# Patient Record
Sex: FEMALE | Race: BLACK OR AFRICAN AMERICAN | NOT HISPANIC OR LATINO | URBAN - METROPOLITAN AREA
[De-identification: names, ages, dates, MRNs, and addresses within clinical notes are randomized per-mention and may not be internally consistent; named-entity substitution may affect disease eponyms.]

---

## 2020-02-26 ENCOUNTER — EMERGENCY (EMERGENCY)
Facility: HOSPITAL | Age: 50
LOS: 1 days | Discharge: HOME | End: 2020-02-26
Attending: EMERGENCY MEDICINE | Admitting: EMERGENCY MEDICINE
Payer: OTHER MISCELLANEOUS

## 2020-02-26 VITALS
HEART RATE: 82 BPM | OXYGEN SATURATION: 98 % | SYSTOLIC BLOOD PRESSURE: 165 MMHG | RESPIRATION RATE: 20 BRPM | DIASTOLIC BLOOD PRESSURE: 89 MMHG | TEMPERATURE: 98 F

## 2020-02-26 VITALS
OXYGEN SATURATION: 99 % | WEIGHT: 160.06 LBS | HEART RATE: 73 BPM | TEMPERATURE: 98 F | SYSTOLIC BLOOD PRESSURE: 173 MMHG | HEIGHT: 64 IN | RESPIRATION RATE: 18 BRPM | DIASTOLIC BLOOD PRESSURE: 115 MMHG

## 2020-02-26 LAB — GLUCOSE BLDC GLUCOMTR-MCNC: 100 MG/DL — HIGH (ref 70–99)

## 2020-02-26 PROCEDURE — 99284 EMERGENCY DEPT VISIT MOD MDM: CPT

## 2020-02-26 RX ORDER — KETOROLAC TROMETHAMINE 30 MG/ML
60 SYRINGE (ML) INJECTION ONCE
Refills: 0 | Status: DISCONTINUED | OUTPATIENT
Start: 2020-02-26 | End: 2020-02-26

## 2020-02-26 RX ORDER — CYCLOBENZAPRINE HYDROCHLORIDE 10 MG/1
1 TABLET, FILM COATED ORAL
Qty: 15 | Refills: 0
Start: 2020-02-26 | End: 2020-03-01

## 2020-02-26 RX ORDER — IBUPROFEN 200 MG
1 TABLET ORAL
Qty: 30 | Refills: 0
Start: 2020-02-26

## 2020-02-26 RX ORDER — ACETAMINOPHEN 500 MG
2 TABLET ORAL
Qty: 60 | Refills: 0
Start: 2020-02-26

## 2020-02-26 RX ORDER — CYCLOBENZAPRINE HYDROCHLORIDE 10 MG/1
10 TABLET, FILM COATED ORAL ONCE
Refills: 0 | Status: COMPLETED | OUTPATIENT
Start: 2020-02-26 | End: 2020-02-26

## 2020-02-26 RX ORDER — ACETAMINOPHEN 500 MG
975 TABLET ORAL ONCE
Refills: 0 | Status: COMPLETED | OUTPATIENT
Start: 2020-02-26 | End: 2020-02-26

## 2020-02-26 RX ADMIN — Medication 60 MILLIGRAM(S): at 16:42

## 2020-02-26 RX ADMIN — Medication 975 MILLIGRAM(S): at 16:42

## 2020-02-26 RX ADMIN — CYCLOBENZAPRINE HYDROCHLORIDE 10 MILLIGRAM(S): 10 TABLET, FILM COATED ORAL at 16:42

## 2020-02-26 NOTE — ED PROVIDER NOTE - CHPI ED SYMPTOMS NEG
no chest pain, no SOB, no abdominal pain, no palpitations, no headache, no syncope/no blurred vision

## 2020-02-26 NOTE — ED ADULT TRIAGE NOTE - CHIEF COMPLAINT QUOTE
Patient to ED with complaint of dizziness, nausea, vomiting and neck pain.  Patient hypertensive in triage

## 2020-02-26 NOTE — ED PROVIDER NOTE - PROVIDER TOKENS
PROVIDER:[TOKEN:[[id number]]],PROVIDER:[TOKEN:[[id number]]],PROVIDER:[TOKEN:[[id number]]],PROVIDER:[TOKEN:[[id number]]]

## 2020-02-26 NOTE — ED PROVIDER NOTE - PATIENT PORTAL LINK FT
You can access the FollowMyHealth Patient Portal offered by [facility] by registering at the following website: [email or web address]. By joining [employer]’s FollowMyHealth portal, you will also be able to view your health information using other applications (apps) compatible with our system.

## 2020-02-26 NOTE — ED PROVIDER NOTE - OBJECTIVE STATEMENT
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 Pt states that 3 years ago she developed left UE, hip, and back pain after performing repetitive movements while at work. PT is an [employer] employee. She states she has been out of work since last summer due to vertigo and returned to work today. Today, during her first day back, she thinks she may have performed repetitive movements too much and exacerbated left arm/shoulder pain. In addition, while a train was passing by, she had a vertigo dizzy spell with associated N/V. Pt became anxious and concerned and decided to come to ED. Pt denies current vertigo sensation. No chest pain, SOB, abd pain, palpitations, headache, blurred vision, syncope, or other sx endorsed.

## 2020-02-26 NOTE — ED ADULT NURSE NOTE - OBJECTIVE STATEMENT
50 y/o F c/o L shoulder pain today. reports she has hx of shoulder pain and strain, reports she went back to work today and was doing strenuous activity when it became much more painful with movement. reports she has hx of vertigo and htn and she began to feel dizzy when the trains were passing her. reports the episode has since resolved but the shoulder feels very "tight".

## 2020-02-26 NOTE — ED PROVIDER NOTE - CLINICAL SUMMARY MEDICAL DECISION MAKING FREE TEXT BOX
Patient presenting with mild vertigo and L shoulder pain radiating down left arm (gets both these in recurrent manner). Got Toradol, APA and Flexeril; (used to take this0. Will d/c with same. Rec fu for body work and PT.

## 2020-02-26 NOTE — ED PROVIDER NOTE - CARE PROVIDERS DIRECT ADDRESSES
[email or web address]

## 2020-02-26 NOTE — ED PROVIDER NOTE - CARE PROVIDER_API CALL
Aba Dejesus)  Otolaryngology  [address] [facility], 2nd [place] [address]  Phone: [phone]  Fax: [phone]  Follow Up Time:     Gian Khoury)  Otolaryngology  [address] [place] [address]  Phone: [phone]  Fax: [phone]  Follow Up Time:     Madeleine Borden)  Surgical Physicians  [address] [place] [address], [address] [place] [address]  Phone: [phone]  Fax: [phone]  Follow Up Time:     Marielos Alberts)  Neurological Surgery  [address] [place] [address], [facility] [address]  [place] [address]  Phone: [phone]  Fax: [phone]  Follow Up Time:

## 2020-02-26 NOTE — ED PROVIDER NOTE - NSFOLLOWUPINSTRUCTIONS_ED_ALL_ED_FT
Please consider asking your ENT MD (or see one of ours) for vestibular PT (got vertigo).    You might also benefit from acupuncture, massage and an osteopathic evaluation.    Consider follow up with Dr. Flynn or Daryl at [facility] [place].     [facility] has a community clinic for massage and acupuncture.

## 2020-03-03 DIAGNOSIS — R42 DIZZINESS AND GIDDINESS: ICD-10-CM

## 2020-03-03 DIAGNOSIS — M62.838 OTHER MUSCLE SPASM: ICD-10-CM

## 2023-04-06 ENCOUNTER — EMERGENCY (EMERGENCY)
Facility: HOSPITAL | Age: 53
LOS: 1 days | Discharge: HOME | End: 2023-04-06
Attending: EMERGENCY MEDICINE | Admitting: EMERGENCY MEDICINE
Payer: MEDICAID

## 2023-04-06 VITALS
SYSTOLIC BLOOD PRESSURE: 121 MMHG | HEART RATE: 68 BPM | DIASTOLIC BLOOD PRESSURE: 86 MMHG | WEIGHT: 160.06 LBS | OXYGEN SATURATION: 100 % | HEIGHT: 65 IN | TEMPERATURE: 98 F | RESPIRATION RATE: 18 BRPM

## 2023-04-06 VITALS
SYSTOLIC BLOOD PRESSURE: 143 MMHG | RESPIRATION RATE: 16 BRPM | HEART RATE: 71 BPM | DIASTOLIC BLOOD PRESSURE: 91 MMHG | OXYGEN SATURATION: 97 %

## 2023-04-06 DIAGNOSIS — I10 ESSENTIAL (PRIMARY) HYPERTENSION: ICD-10-CM

## 2023-04-06 DIAGNOSIS — R11.0 NAUSEA: ICD-10-CM

## 2023-04-06 DIAGNOSIS — Z88.0 ALLERGY STATUS TO PENICILLIN: ICD-10-CM

## 2023-04-06 DIAGNOSIS — E03.9 HYPOTHYROIDISM, UNSPECIFIED: ICD-10-CM

## 2023-04-06 DIAGNOSIS — R10.13 EPIGASTRIC PAIN: ICD-10-CM

## 2023-04-06 PROBLEM — R42 DIZZINESS AND GIDDINESS: Chronic | Status: ACTIVE | Noted: 2020-02-26

## 2023-04-06 LAB
ALBUMIN SERPL ELPH-MCNC: 3.8 G/DL — SIGNIFICANT CHANGE UP (ref 3.4–5)
ALP SERPL-CCNC: 83 U/L — SIGNIFICANT CHANGE UP (ref 40–120)
ALT FLD-CCNC: 47 U/L — HIGH (ref 12–42)
ANION GAP SERPL CALC-SCNC: 7 MMOL/L — LOW (ref 9–16)
AST SERPL-CCNC: 30 U/L — SIGNIFICANT CHANGE UP (ref 15–37)
BASOPHILS # BLD AUTO: 0.02 K/UL — SIGNIFICANT CHANGE UP (ref 0–0.2)
BASOPHILS NFR BLD AUTO: 0.3 % — SIGNIFICANT CHANGE UP (ref 0–2)
BILIRUB DIRECT SERPL-MCNC: 0.1 MG/DL — SIGNIFICANT CHANGE UP (ref 0–0.3)
BILIRUB INDIRECT FLD-MCNC: 0.3 MG/DL — SIGNIFICANT CHANGE UP (ref 0.2–1)
BILIRUB SERPL-MCNC: 0.4 MG/DL — SIGNIFICANT CHANGE UP (ref 0.2–1.2)
BUN SERPL-MCNC: 14 MG/DL — SIGNIFICANT CHANGE UP (ref 7–23)
CALCIUM SERPL-MCNC: 9.2 MG/DL — SIGNIFICANT CHANGE UP (ref 8.5–10.5)
CHLORIDE SERPL-SCNC: 105 MMOL/L — SIGNIFICANT CHANGE UP (ref 96–108)
CO2 SERPL-SCNC: 31 MMOL/L — SIGNIFICANT CHANGE UP (ref 22–31)
CREAT SERPL-MCNC: 1.02 MG/DL — SIGNIFICANT CHANGE UP (ref 0.5–1.3)
EGFR: 66 ML/MIN/1.73M2 — SIGNIFICANT CHANGE UP
EOSINOPHIL # BLD AUTO: 0.16 K/UL — SIGNIFICANT CHANGE UP (ref 0–0.5)
EOSINOPHIL NFR BLD AUTO: 2.7 % — SIGNIFICANT CHANGE UP (ref 0–6)
GLUCOSE SERPL-MCNC: 95 MG/DL — SIGNIFICANT CHANGE UP (ref 70–99)
HCT VFR BLD CALC: 42.5 % — SIGNIFICANT CHANGE UP (ref 34.5–45)
HGB BLD-MCNC: 14 G/DL — SIGNIFICANT CHANGE UP (ref 11.5–15.5)
IMM GRANULOCYTES NFR BLD AUTO: 0.2 % — SIGNIFICANT CHANGE UP (ref 0–0.9)
LIDOCAIN IGE QN: 147 U/L — SIGNIFICANT CHANGE UP (ref 73–393)
LYMPHOCYTES # BLD AUTO: 2.39 K/UL — SIGNIFICANT CHANGE UP (ref 1–3.3)
LYMPHOCYTES # BLD AUTO: 39.8 % — SIGNIFICANT CHANGE UP (ref 13–44)
MCHC RBC-ENTMCNC: 29.4 PG — SIGNIFICANT CHANGE UP (ref 27–34)
MCHC RBC-ENTMCNC: 32.9 GM/DL — SIGNIFICANT CHANGE UP (ref 32–36)
MCV RBC AUTO: 89.1 FL — SIGNIFICANT CHANGE UP (ref 80–100)
MONOCYTES # BLD AUTO: 0.4 K/UL — SIGNIFICANT CHANGE UP (ref 0–0.9)
MONOCYTES NFR BLD AUTO: 6.7 % — SIGNIFICANT CHANGE UP (ref 2–14)
NEUTROPHILS # BLD AUTO: 3.03 K/UL — SIGNIFICANT CHANGE UP (ref 1.8–7.4)
NEUTROPHILS NFR BLD AUTO: 50.3 % — SIGNIFICANT CHANGE UP (ref 43–77)
NRBC # BLD: 0 /100 WBCS — SIGNIFICANT CHANGE UP (ref 0–0)
PLATELET # BLD AUTO: 189 K/UL — SIGNIFICANT CHANGE UP (ref 150–400)
POTASSIUM SERPL-MCNC: 4.1 MMOL/L — SIGNIFICANT CHANGE UP (ref 3.5–5.3)
POTASSIUM SERPL-SCNC: 4.1 MMOL/L — SIGNIFICANT CHANGE UP (ref 3.5–5.3)
PROT SERPL-MCNC: 7.7 G/DL — SIGNIFICANT CHANGE UP (ref 6.4–8.2)
RBC # BLD: 4.77 M/UL — SIGNIFICANT CHANGE UP (ref 3.8–5.2)
RBC # FLD: 13.2 % — SIGNIFICANT CHANGE UP (ref 10.3–14.5)
SODIUM SERPL-SCNC: 143 MMOL/L — SIGNIFICANT CHANGE UP (ref 132–145)
WBC # BLD: 6.01 K/UL — SIGNIFICANT CHANGE UP (ref 3.8–10.5)
WBC # FLD AUTO: 6.01 K/UL — SIGNIFICANT CHANGE UP (ref 3.8–10.5)

## 2023-04-06 PROCEDURE — 99284 EMERGENCY DEPT VISIT MOD MDM: CPT

## 2023-04-06 RX ORDER — SODIUM CHLORIDE 9 MG/ML
1000 INJECTION INTRAMUSCULAR; INTRAVENOUS; SUBCUTANEOUS ONCE
Refills: 0 | Status: COMPLETED | OUTPATIENT
Start: 2023-04-06 | End: 2023-04-06

## 2023-04-06 RX ORDER — ONDANSETRON 8 MG/1
4 TABLET, FILM COATED ORAL ONCE
Refills: 0 | Status: COMPLETED | OUTPATIENT
Start: 2023-04-06 | End: 2023-04-06

## 2023-04-06 RX ORDER — FAMOTIDINE 10 MG/ML
20 INJECTION INTRAVENOUS ONCE
Refills: 0 | Status: COMPLETED | OUTPATIENT
Start: 2023-04-06 | End: 2023-04-06

## 2023-04-06 RX ADMIN — FAMOTIDINE 20 MILLIGRAM(S): 10 INJECTION INTRAVENOUS at 16:41

## 2023-04-06 RX ADMIN — ONDANSETRON 4 MILLIGRAM(S): 8 TABLET, FILM COATED ORAL at 16:41

## 2023-04-06 RX ADMIN — SODIUM CHLORIDE 1000 MILLILITER(S): 9 INJECTION INTRAMUSCULAR; INTRAVENOUS; SUBCUTANEOUS at 16:41

## 2023-04-06 NOTE — ED PROVIDER NOTE - PATIENT PORTAL LINK FT
You can access the FollowMyHealth Patient Portal offered by [facility] by registering at the following website: [email or web address]. By joining [employer]’s FollowMyHealth portal, you will also be able to view your health information using other applications (apps) compatible with our system.

## 2023-04-06 NOTE — ED ADULT TRIAGE NOTE - CHIEF COMPLAINT QUOTE
Pt walked in c/o of upper abdominal pain and nausea x yesterday. Denies fevers or V/D. PMH of HTN. Sent in from  for further evaluation.

## 2023-04-06 NOTE — ED PROVIDER NOTE - CLINICAL SUMMARY MEDICAL DECISION MAKING FREE TEXT BOX
52-year-old female presents emergency department with history concerning for early gastroenteritis.  We will do labs give Pepcid Zofran fluids and reassess.

## 2023-04-06 NOTE — ED PROVIDER NOTE - OBJECTIVE STATEMENT
52-year-old female past medical history of hypothyroidism hypertension presents emergency department for nausea and epigastric pain and sensation that she thinks she might have diarrhea.  No chest pain no shortness of breath no fever no chills.  No history of abdominal surgeries.

## 2023-04-06 NOTE — ED ADULT NURSE NOTE - NSIMPLEMENTINTERV_GEN_ALL_ED
Implemented All Universal Safety Interventions:  [place] to call system. Call bell, personal items and telephone within reach. Instruct patient to call for assistance. Room bathroom lighting operational. Non-slip footwear when patient is off stretcher. Physically safe environment: no spills, clutter or unnecessary equipment. Stretcher in lowest position, wheels locked, appropriate side rails in place.

## 2023-04-06 NOTE — ED PROVIDER NOTE - PHYSICAL EXAMINATION
Const: NAD  Eyes: PERRL, no conjunctival injection  HENT:  Neck supple without meningismus   CV: RRR, Warm, well-perfused extremities  RESP: CTA B/L, no tachypnea   GI: soft, epigastric tenderess, non-distended  MSK: No gross deformities appreciated  Skin: Warm, dry. No rashes  Neuro: Alert, CNs II-XII grossly intact. Sensation and motor function of extremities grossly intact.  Psych: Appropriate mood and affect.

## 2023-04-06 NOTE — ED ADULT NURSE NOTE - OBJECTIVE STATEMENT
pt is 52y female, here for epigastric abd pain and nausea since yesterday, pt denies any vomiting, fevers or diarrhea, pain is cramping and non radiating, pt reports she was referred to ED for possibly abnormal EKG, pt is a&o3, ambulatory with steady gait, abd soft, non distended, non tender, NAD noted

## 2023-04-06 NOTE — ED PROVIDER NOTE - NSFOLLOWUPINSTRUCTIONS_ED_ALL_ED_FT

## 2024-10-03 NOTE — ED PROVIDER NOTE - INTERNATIONAL TRAVEL
Quality 47: Advance Care Plan: Advance Care Planning discussed and documented; advance care plan or surrogate decision maker documented in the medical record. Quality 226: Preventive Care And Screening: Tobacco Use: Screening And Cessation Intervention: Patient screened for tobacco use and is an ex/non-smoker Detail Level: Detailed Quality 130: Documentation Of Current Medications In The Medical Record: Current Medications Documented No

## 2025-04-13 ENCOUNTER — EMERGENCY (EMERGENCY)
Facility: HOSPITAL | Age: 55
LOS: 1 days | End: 2025-04-13
Attending: EMERGENCY MEDICINE | Admitting: EMERGENCY MEDICINE
Payer: MEDICAID

## 2025-04-13 VITALS
OXYGEN SATURATION: 94 % | HEART RATE: 67 BPM | DIASTOLIC BLOOD PRESSURE: 93 MMHG | RESPIRATION RATE: 16 BRPM | SYSTOLIC BLOOD PRESSURE: 148 MMHG | TEMPERATURE: 97 F

## 2025-04-13 VITALS
HEART RATE: 80 BPM | DIASTOLIC BLOOD PRESSURE: 90 MMHG | OXYGEN SATURATION: 97 % | SYSTOLIC BLOOD PRESSURE: 138 MMHG | TEMPERATURE: 98 F | RESPIRATION RATE: 16 BRPM

## 2025-04-13 LAB
ALBUMIN SERPL ELPH-MCNC: 0.7 G/DL — LOW (ref 3.4–5)
ALP SERPL-CCNC: 80 U/L — SIGNIFICANT CHANGE UP (ref 40–120)
ALT FLD-CCNC: 63 U/L — HIGH (ref 12–42)
ANION GAP SERPL CALC-SCNC: 1 MMOL/L — LOW (ref 9–16)
APPEARANCE UR: CLEAR — SIGNIFICANT CHANGE UP
AST SERPL-CCNC: 63 U/L — HIGH (ref 15–37)
BASOPHILS # BLD AUTO: 0.02 K/UL — SIGNIFICANT CHANGE UP (ref 0–0.2)
BASOPHILS NFR BLD AUTO: 0.3 % — SIGNIFICANT CHANGE UP (ref 0–2)
BILIRUB SERPL-MCNC: 0.3 MG/DL — SIGNIFICANT CHANGE UP (ref 0.2–1.2)
BILIRUB UR-MCNC: NEGATIVE — SIGNIFICANT CHANGE UP
BUN SERPL-MCNC: 17 MG/DL — SIGNIFICANT CHANGE UP (ref 7–23)
CALCIUM SERPL-MCNC: 9.1 MG/DL — SIGNIFICANT CHANGE UP (ref 8.5–10.5)
CHLORIDE SERPL-SCNC: 105 MMOL/L — SIGNIFICANT CHANGE UP (ref 96–108)
CO2 SERPL-SCNC: 35 MMOL/L — HIGH (ref 22–31)
COLOR SPEC: YELLOW — SIGNIFICANT CHANGE UP
CREAT SERPL-MCNC: 0.83 MG/DL — SIGNIFICANT CHANGE UP (ref 0.5–1.3)
DIFF PNL FLD: NEGATIVE — SIGNIFICANT CHANGE UP
EGFR: 83 ML/MIN/1.73M2 — SIGNIFICANT CHANGE UP
EGFR: 83 ML/MIN/1.73M2 — SIGNIFICANT CHANGE UP
EOSINOPHIL # BLD AUTO: 0.04 K/UL — SIGNIFICANT CHANGE UP (ref 0–0.5)
EOSINOPHIL NFR BLD AUTO: 0.6 % — SIGNIFICANT CHANGE UP (ref 0–6)
GLUCOSE SERPL-MCNC: 120 MG/DL — HIGH (ref 70–99)
GLUCOSE UR QL: NEGATIVE MG/DL — SIGNIFICANT CHANGE UP
HCT VFR BLD CALC: 42.6 % — SIGNIFICANT CHANGE UP (ref 34.5–45)
HGB BLD-MCNC: 14.2 G/DL — SIGNIFICANT CHANGE UP (ref 11.5–15.5)
IMM GRANULOCYTES # BLD AUTO: 0.01 K/UL — SIGNIFICANT CHANGE UP (ref 0–0.07)
IMM GRANULOCYTES NFR BLD AUTO: 0.2 % — SIGNIFICANT CHANGE UP (ref 0–0.9)
KETONES UR-MCNC: NEGATIVE MG/DL — SIGNIFICANT CHANGE UP
LACTATE BLDV-MCNC: 1.5 MMOL/L — SIGNIFICANT CHANGE UP (ref 0.5–2)
LEUKOCYTE ESTERASE UR-ACNC: NEGATIVE — SIGNIFICANT CHANGE UP
LIDOCAIN IGE QN: 69 U/L — SIGNIFICANT CHANGE UP (ref 16–77)
LYMPHOCYTES # BLD AUTO: 1.16 K/UL — SIGNIFICANT CHANGE UP (ref 1–3.3)
LYMPHOCYTES NFR BLD AUTO: 18.6 % — SIGNIFICANT CHANGE UP (ref 13–44)
MCHC RBC-ENTMCNC: 28.8 PG — SIGNIFICANT CHANGE UP (ref 27–34)
MCHC RBC-ENTMCNC: 33.3 G/DL — SIGNIFICANT CHANGE UP (ref 32–36)
MCV RBC AUTO: 86.4 FL — SIGNIFICANT CHANGE UP (ref 80–100)
MONOCYTES # BLD AUTO: 0.55 K/UL — SIGNIFICANT CHANGE UP (ref 0–0.9)
MONOCYTES NFR BLD AUTO: 8.8 % — SIGNIFICANT CHANGE UP (ref 2–14)
NEUTROPHILS # BLD AUTO: 4.45 K/UL — SIGNIFICANT CHANGE UP (ref 1.8–7.4)
NEUTROPHILS NFR BLD AUTO: 71.5 % — SIGNIFICANT CHANGE UP (ref 43–77)
NITRITE UR-MCNC: NEGATIVE — SIGNIFICANT CHANGE UP
NRBC # BLD AUTO: 0 K/UL — SIGNIFICANT CHANGE UP (ref 0–0)
NRBC # FLD: 0 K/UL — SIGNIFICANT CHANGE UP (ref 0–0)
NRBC BLD AUTO-RTO: 0 /100 WBCS — SIGNIFICANT CHANGE UP (ref 0–0)
PH UR: 6 — SIGNIFICANT CHANGE UP (ref 5–8)
PLATELET # BLD AUTO: 157 K/UL — SIGNIFICANT CHANGE UP (ref 150–400)
PMV BLD: 9.5 FL — SIGNIFICANT CHANGE UP (ref 7–13)
POTASSIUM SERPL-MCNC: 3.4 MMOL/L — LOW (ref 3.5–5.3)
POTASSIUM SERPL-SCNC: 3.4 MMOL/L — LOW (ref 3.5–5.3)
PROT SERPL-MCNC: 7.3 G/DL — SIGNIFICANT CHANGE UP (ref 6.4–8.2)
PROT UR-MCNC: NEGATIVE MG/DL — SIGNIFICANT CHANGE UP
RBC # BLD: 4.93 M/UL — SIGNIFICANT CHANGE UP (ref 3.8–5.2)
RBC # FLD: 12.7 % — SIGNIFICANT CHANGE UP (ref 10.3–14.5)
SODIUM SERPL-SCNC: 141 MMOL/L — SIGNIFICANT CHANGE UP (ref 132–145)
SP GR SPEC: 1.01 — SIGNIFICANT CHANGE UP (ref 1–1.03)
UROBILINOGEN FLD QL: 0.2 MG/DL — SIGNIFICANT CHANGE UP (ref 0.2–1)
WBC # BLD: 6.23 K/UL — SIGNIFICANT CHANGE UP (ref 3.8–10.5)
WBC # FLD AUTO: 6.23 K/UL — SIGNIFICANT CHANGE UP (ref 3.8–10.5)

## 2025-04-13 PROCEDURE — 74177 CT ABD & PELVIS W/CONTRAST: CPT | Mod: 26

## 2025-04-13 PROCEDURE — 76705 ECHO EXAM OF ABDOMEN: CPT | Mod: 26

## 2025-04-13 PROCEDURE — 99284 EMERGENCY DEPT VISIT MOD MDM: CPT

## 2025-04-13 RX ORDER — ONDANSETRON HCL/PF 4 MG/2 ML
1 VIAL (ML) INJECTION
Qty: 15 | Refills: 0
Start: 2025-04-13

## 2025-04-13 RX ORDER — KETOROLAC TROMETHAMINE 30 MG/ML
15 INJECTION, SOLUTION INTRAMUSCULAR; INTRAVENOUS ONCE
Refills: 0 | Status: DISCONTINUED | OUTPATIENT
Start: 2025-04-13 | End: 2025-04-13

## 2025-04-13 RX ORDER — IOHEXOL 350 MG/ML
30 INJECTION, SOLUTION INTRAVENOUS ONCE
Refills: 0 | Status: COMPLETED | OUTPATIENT
Start: 2025-04-13 | End: 2025-04-13

## 2025-04-13 RX ORDER — MAGNESIUM, ALUMINUM HYDROXIDE 200-200 MG
30 TABLET,CHEWABLE ORAL ONCE
Refills: 0 | Status: COMPLETED | OUTPATIENT
Start: 2025-04-13 | End: 2025-04-13

## 2025-04-13 RX ORDER — ONDANSETRON HCL/PF 4 MG/2 ML
4 VIAL (ML) INJECTION ONCE
Refills: 0 | Status: COMPLETED | OUTPATIENT
Start: 2025-04-13 | End: 2025-04-13

## 2025-04-13 RX ORDER — NAPROXEN SODIUM 275 MG
1 TABLET ORAL
Qty: 20 | Refills: 0
Start: 2025-04-13

## 2025-04-13 RX ADMIN — Medication 4 MILLIGRAM(S): at 09:42

## 2025-04-13 RX ADMIN — KETOROLAC TROMETHAMINE 15 MILLIGRAM(S): 30 INJECTION, SOLUTION INTRAMUSCULAR; INTRAVENOUS at 11:58

## 2025-04-13 RX ADMIN — Medication 1000 MILLILITER(S): at 09:42

## 2025-04-13 RX ADMIN — Medication 1000 MILLILITER(S): at 12:01

## 2025-04-13 RX ADMIN — Medication 30 MILLILITER(S): at 09:42

## 2025-04-13 RX ADMIN — IOHEXOL 30 MILLILITER(S): 350 INJECTION, SOLUTION INTRAVENOUS at 09:42

## 2025-04-13 RX ADMIN — KETOROLAC TROMETHAMINE 15 MILLIGRAM(S): 30 INJECTION, SOLUTION INTRAMUSCULAR; INTRAVENOUS at 14:36

## 2025-04-13 NOTE — ED PROVIDER NOTE - CARE PLAN
1 Principal Discharge DX:	Abdominal pain   Principal Discharge DX:	Abdominal pain  Secondary Diagnosis:	Common bile duct dilation  Secondary Diagnosis:	Abnormal abdominal CT scan

## 2025-04-13 NOTE — ED ADULT NURSE NOTE - NSFALLUNIVINTERV_ED_ALL_ED
Bed/Stretcher in lowest position, wheels locked, appropriate side rails in place/Call bell, personal items and telephone in reach/Instruct patient to call for assistance before getting out of bed/chair/stretcher/Non-slip footwear applied when patient is off stretcher/[place] to call system/Physically safe environment - no spills, clutter or unnecessary equipment/Purposeful proactive rounding/Room/bathroom lighting operational, light cord in reach

## 2025-04-13 NOTE — ED PROVIDER NOTE - PATIENT PORTAL LINK FT
You can access the FollowMyHealth Patient Portal offered by [facility] by registering at the following website: [email or web address]. By joining [employer]’s FollowMyHealth portal, you will also be able to view your health information using other applications (apps) compatible with our system.

## 2025-04-13 NOTE — ED PROVIDER NOTE - ATTENDING APP SHARED VISIT CONTRIBUTION OF CARE
54-year-old female past medical history of hypertension presents today with abdominal pain, nausea, vomiting, diarrhea that started yesterday to today.  Patient reports she had some URI symptoms of a sore throat and chills last week and then the GI symptoms started Saturday.  Denies associated dysuria, hematuria, flank pain, headache, dizziness, chest pain, shortness of breath, flank pain, hematochezia, recent travel or antibiotic use    CT and sono performed.  Case d/w Dr Nuno attending surgeon.  OUtpatient follow up.

## 2025-04-13 NOTE — ED PROVIDER NOTE - CLINICAL SUMMARY MEDICAL DECISION MAKING FREE TEXT BOX
54-year-old female past medical history of hypertension presents today with abdominal pain, nausea, vomiting, diarrhea that started yesterday to today.  Patient reports she had some URI symptoms of a sore throat and chills last week and then the GI symptoms started Saturday.   patient afebrile, vital signs stable.  Patient with tenderness to palpation in the abdomen, suspect that symptoms are likely gastroenteritis from proceeding URI however given her, vomiting, diarrhea, CT abdomen pelvis throughout intra-abdominal pathology.  Will reassess after labs and imaging for disposition 54-year-old female past medical history of hypertension presents today with abdominal pain, nausea, vomiting, diarrhea that started yesterday to today.  Patient reports she had some URI symptoms of a sore throat and chills last week and then the GI symptoms started Saturday.   patient afebrile, vital signs stable.  Patient with tenderness to palpation in the abdomen, suspect that symptoms are likely gastroenteritis from proceeding URI however given her, vomiting, diarrhea, CT abdomen pelvis throughout intra-abdominal pathology.  Will reassess after labs and imaging for disposition    1413–reviewed results with patient, abdomen soft tolerated p.o.  Discussed case with Dr. Nuno  Of general surgery and recommended outpatient follow-up.  Will have patient follow-up outpatient with general surgery, strict return precautions discussed with the patient   reviewed abnormal findings on the CT scan with the patient as well, will have patient follow-up with gastroenterology for incidental findings.

## 2025-04-13 NOTE — ED PROVIDER NOTE - CARE PROVIDER_API CALL
Luz Nuno   Surgery  [address], [address]  [place] [address]  Phone: [phone]  Fax: [phone]  Follow Up Time: Urgent

## 2025-04-13 NOTE — ED PROVIDER NOTE - NS ED ROS FT
· CONSTITUTIONAL: no fever and no chills.  · CARDIOVASCULAR: normal rate and rhythm, no chest pain and no edema.  · RESPIRATORY: no chest pain, no cough, and no shortness of breath.  · GASTROINTESTINAL: + abdominal pain, no bloating, no constipation, +NVD  · MUSCULOSKELETAL: no back pain, no musculoskeletal pain, no neck pain, and no weakness.  · SKIN: no abrasions, no jaundice, no lesions, no pruritis, and no rashes.  · NEURO: no loss of consciousness, no gait abnormality, no headache, no sensory deficits, and no weakness.  · PSYCHIATRIC: no known mental health issues.

## 2025-04-13 NOTE — ED PROVIDER NOTE - OBJECTIVE STATEMENT
54-year-old female past medical history of hypertension presents today with abdominal pain, nausea, vomiting, diarrhea that started yesterday to today.  Patient reports she had some URI symptoms of a sore throat and chills last week and then the GI symptoms started Saturday.  Denies associated dysuria, hematuria, flank pain, headache, dizziness, chest pain, shortness of breath, flank pain, hematochezia, recent travel or antibiotic use

## 2025-04-14 ENCOUNTER — EMERGENCY (EMERGENCY)
Facility: HOSPITAL | Age: 55
LOS: 1 days | End: 2025-04-14
Attending: EMERGENCY MEDICINE | Admitting: EMERGENCY MEDICINE
Payer: MEDICAID

## 2025-04-14 ENCOUNTER — INPATIENT (INPATIENT)
Facility: HOSPITAL | Age: 55
LOS: 1 days | Discharge: HOME | End: 2025-04-16
Attending: STUDENT IN AN ORGANIZED HEALTH CARE EDUCATION/TRAINING PROGRAM | Admitting: STUDENT IN AN ORGANIZED HEALTH CARE EDUCATION/TRAINING PROGRAM
Payer: COMMERCIAL

## 2025-04-14 VITALS
RESPIRATION RATE: 16 BRPM | TEMPERATURE: 99 F | OXYGEN SATURATION: 95 % | DIASTOLIC BLOOD PRESSURE: 86 MMHG | SYSTOLIC BLOOD PRESSURE: 149 MMHG | HEART RATE: 69 BPM

## 2025-04-14 VITALS
RESPIRATION RATE: 16 BRPM | HEART RATE: 74 BPM | SYSTOLIC BLOOD PRESSURE: 142 MMHG | DIASTOLIC BLOOD PRESSURE: 78 MMHG | TEMPERATURE: 99 F | OXYGEN SATURATION: 97 %

## 2025-04-14 VITALS
HEART RATE: 94 BPM | OXYGEN SATURATION: 94 % | WEIGHT: 179.9 LBS | HEIGHT: 65 IN | TEMPERATURE: 98 F | SYSTOLIC BLOOD PRESSURE: 146 MMHG | DIASTOLIC BLOOD PRESSURE: 101 MMHG | RESPIRATION RATE: 18 BRPM

## 2025-04-14 DIAGNOSIS — Z98.890 OTHER SPECIFIED POSTPROCEDURAL STATES: Chronic | ICD-10-CM

## 2025-04-14 DIAGNOSIS — U07.1 COVID-19: ICD-10-CM

## 2025-04-14 DIAGNOSIS — K63.5 POLYP OF COLON: ICD-10-CM

## 2025-04-14 DIAGNOSIS — Z88.0 ALLERGY STATUS TO PENICILLIN: ICD-10-CM

## 2025-04-14 DIAGNOSIS — Z29.9 ENCOUNTER FOR PROPHYLACTIC MEASURES, UNSPECIFIED: ICD-10-CM

## 2025-04-14 DIAGNOSIS — R19.7 DIARRHEA, UNSPECIFIED: ICD-10-CM

## 2025-04-14 DIAGNOSIS — I10 ESSENTIAL (PRIMARY) HYPERTENSION: ICD-10-CM

## 2025-04-14 DIAGNOSIS — R10.13 EPIGASTRIC PAIN: ICD-10-CM

## 2025-04-14 DIAGNOSIS — I48.91 UNSPECIFIED ATRIAL FIBRILLATION: ICD-10-CM

## 2025-04-14 DIAGNOSIS — K80.50 CALCULUS OF BILE DUCT WITHOUT CHOLANGITIS OR CHOLECYSTITIS WITHOUT OBSTRUCTION: ICD-10-CM

## 2025-04-14 DIAGNOSIS — R11.2 NAUSEA WITH VOMITING, UNSPECIFIED: ICD-10-CM

## 2025-04-14 LAB
ALBUMIN SERPL ELPH-MCNC: 4 G/DL — SIGNIFICANT CHANGE UP (ref 3.4–5)
ALP SERPL-CCNC: 126 U/L — HIGH (ref 40–120)
ALT FLD-CCNC: 373 U/L — HIGH (ref 12–42)
ANION GAP SERPL CALC-SCNC: 6 MMOL/L — LOW (ref 9–16)
AST SERPL-CCNC: 452 U/L — HIGH (ref 15–37)
BASOPHILS # BLD AUTO: 0.01 K/UL — SIGNIFICANT CHANGE UP (ref 0–0.2)
BASOPHILS NFR BLD AUTO: 0.2 % — SIGNIFICANT CHANGE UP (ref 0–2)
BILIRUB DIRECT SERPL-MCNC: 1.2 MG/DL — HIGH (ref 0–0.3)
BILIRUB SERPL-MCNC: 1.8 MG/DL — HIGH (ref 0.2–1.2)
BUN SERPL-MCNC: 11 MG/DL — SIGNIFICANT CHANGE UP (ref 7–23)
CALCIUM SERPL-MCNC: 9 MG/DL — SIGNIFICANT CHANGE UP (ref 8.5–10.5)
CHLORIDE SERPL-SCNC: 103 MMOL/L — SIGNIFICANT CHANGE UP (ref 96–108)
CO2 SERPL-SCNC: 32 MMOL/L — HIGH (ref 22–31)
CREAT SERPL-MCNC: 1.01 MG/DL — SIGNIFICANT CHANGE UP (ref 0.5–1.3)
EGFR: 66 ML/MIN/1.73M2 — SIGNIFICANT CHANGE UP
EGFR: 66 ML/MIN/1.73M2 — SIGNIFICANT CHANGE UP
EOSINOPHIL # BLD AUTO: 0.05 K/UL — SIGNIFICANT CHANGE UP (ref 0–0.5)
EOSINOPHIL NFR BLD AUTO: 1 % — SIGNIFICANT CHANGE UP (ref 0–6)
FLUAV AG NPH QL: SIGNIFICANT CHANGE UP
FLUBV AG NPH QL: SIGNIFICANT CHANGE UP
GLUCOSE SERPL-MCNC: 113 MG/DL — HIGH (ref 70–99)
HCT VFR BLD CALC: 43 % — SIGNIFICANT CHANGE UP (ref 34.5–45)
HGB BLD-MCNC: 14.2 G/DL — SIGNIFICANT CHANGE UP (ref 11.5–15.5)
IMM GRANULOCYTES # BLD AUTO: 0.01 K/UL — SIGNIFICANT CHANGE UP (ref 0–0.07)
IMM GRANULOCYTES NFR BLD AUTO: 0.2 % — SIGNIFICANT CHANGE UP (ref 0–0.9)
LIDOCAIN IGE QN: 73 U/L — SIGNIFICANT CHANGE UP (ref 16–77)
LYMPHOCYTES # BLD AUTO: 1.8 K/UL — SIGNIFICANT CHANGE UP (ref 1–3.3)
LYMPHOCYTES NFR BLD AUTO: 35 % — SIGNIFICANT CHANGE UP (ref 13–44)
MAGNESIUM SERPL-MCNC: 2.2 MG/DL — SIGNIFICANT CHANGE UP (ref 1.6–2.6)
MCHC RBC-ENTMCNC: 28.7 PG — SIGNIFICANT CHANGE UP (ref 27–34)
MCHC RBC-ENTMCNC: 33 G/DL — SIGNIFICANT CHANGE UP (ref 32–36)
MCV RBC AUTO: 87 FL — SIGNIFICANT CHANGE UP (ref 80–100)
MONOCYTES # BLD AUTO: 0.47 K/UL — SIGNIFICANT CHANGE UP (ref 0–0.9)
MONOCYTES NFR BLD AUTO: 9.1 % — SIGNIFICANT CHANGE UP (ref 2–14)
NEUTROPHILS # BLD AUTO: 2.8 K/UL — SIGNIFICANT CHANGE UP (ref 1.8–7.4)
NEUTROPHILS NFR BLD AUTO: 54.5 % — SIGNIFICANT CHANGE UP (ref 43–77)
NRBC # BLD AUTO: 0 K/UL — SIGNIFICANT CHANGE UP (ref 0–0)
NRBC # FLD: 0 K/UL — SIGNIFICANT CHANGE UP (ref 0–0)
NRBC BLD AUTO-RTO: 0 /100 WBCS — SIGNIFICANT CHANGE UP (ref 0–0)
PLATELET # BLD AUTO: 171 K/UL — SIGNIFICANT CHANGE UP (ref 150–400)
PMV BLD: 9.7 FL — SIGNIFICANT CHANGE UP (ref 7–13)
POTASSIUM SERPL-MCNC: 3.6 MMOL/L — SIGNIFICANT CHANGE UP (ref 3.5–5.3)
POTASSIUM SERPL-SCNC: 3.6 MMOL/L — SIGNIFICANT CHANGE UP (ref 3.5–5.3)
PROT SERPL-MCNC: 7.5 G/DL — SIGNIFICANT CHANGE UP (ref 6.4–8.2)
RBC # BLD: 4.94 M/UL — SIGNIFICANT CHANGE UP (ref 3.8–5.2)
RBC # FLD: 12.9 % — SIGNIFICANT CHANGE UP (ref 10.3–14.5)
RSV RNA NPH QL NAA+NON-PROBE: SIGNIFICANT CHANGE UP
SARS-COV-2 RNA SPEC QL NAA+PROBE: DETECTED
SODIUM SERPL-SCNC: 141 MMOL/L — SIGNIFICANT CHANGE UP (ref 132–145)
SOURCE RESPIRATORY: SIGNIFICANT CHANGE UP
WBC # BLD: 5.14 K/UL — SIGNIFICANT CHANGE UP (ref 3.8–10.5)
WBC # FLD AUTO: 5.14 K/UL — SIGNIFICANT CHANGE UP (ref 3.8–10.5)

## 2025-04-14 PROCEDURE — 71045 X-RAY EXAM CHEST 1 VIEW: CPT | Mod: 26

## 2025-04-14 PROCEDURE — 99285 EMERGENCY DEPT VISIT HI MDM: CPT

## 2025-04-14 PROCEDURE — 74183 MRI ABD W/O CNTR FLWD CNTR: CPT | Mod: 26

## 2025-04-14 PROCEDURE — 99223 1ST HOSP IP/OBS HIGH 75: CPT

## 2025-04-14 RX ORDER — HYDROCHLOROTHIAZIDE 50 MG/1
1 TABLET ORAL
Refills: 0 | DISCHARGE

## 2025-04-14 RX ORDER — ONDANSETRON HCL/PF 4 MG/2 ML
4 VIAL (ML) INJECTION ONCE
Refills: 0 | Status: COMPLETED | OUTPATIENT
Start: 2025-04-14 | End: 2025-04-14

## 2025-04-14 RX ORDER — LISINOPRIL 5 MG/1
1 TABLET ORAL
Refills: 0 | DISCHARGE

## 2025-04-14 RX ORDER — ONDANSETRON HCL/PF 4 MG/2 ML
4 VIAL (ML) INJECTION ONCE
Refills: 0 | Status: ACTIVE | OUTPATIENT
Start: 2025-04-14

## 2025-04-14 RX ORDER — LISINOPRIL 5 MG/1
10 TABLET ORAL EVERY 24 HOURS
Refills: 0 | Status: DISCONTINUED | OUTPATIENT
Start: 2025-04-14 | End: 2025-04-16

## 2025-04-14 RX ORDER — INFLUENZA A VIRUS A/IDAHO/07/2018 (H1N1) ANTIGEN (MDCK CELL DERIVED, PROPIOLACTONE INACTIVATED, INFLUENZA A VIRUS A/INDIANA/08/2018 (H3N2) ANTIGEN (MDCK CELL DERIVED, PROPIOLACTONE INACTIVATED), INFLUENZA B VIRUS B/SINGAPORE/INFTT-16-0610/2016 ANTIGEN (MDCK CELL DERIVED, PROPIOLACTONE INACTIVATED), INFLUENZA B VIRUS B/IOWA/06/2017 ANTIGEN (MDCK CELL DERIVED, PROPIOLACTONE INACTIVATED) 15; 15; 15; 15 UG/.5ML; UG/.5ML; UG/.5ML; UG/.5ML
0.5 INJECTION, SUSPENSION INTRAMUSCULAR ONCE
Refills: 0 | Status: COMPLETED | OUTPATIENT
Start: 2025-04-14 | End: 2025-04-14

## 2025-04-14 RX ORDER — ONDANSETRON HCL/PF 4 MG/2 ML
4 VIAL (ML) INJECTION EVERY 8 HOURS
Refills: 0 | Status: DISCONTINUED | OUTPATIENT
Start: 2025-04-14 | End: 2025-04-16

## 2025-04-14 RX ORDER — CIPROFLOXACIN HCL 250 MG
400 TABLET ORAL ONCE
Refills: 0 | Status: COMPLETED | OUTPATIENT
Start: 2025-04-14 | End: 2025-04-14

## 2025-04-14 RX ORDER — METRONIDAZOLE 250 MG
500 TABLET ORAL ONCE
Refills: 0 | Status: COMPLETED | OUTPATIENT
Start: 2025-04-14 | End: 2025-04-14

## 2025-04-14 RX ADMIN — Medication 4 MILLIGRAM(S): at 03:36

## 2025-04-14 RX ADMIN — Medication 200 MILLIGRAM(S): at 03:36

## 2025-04-14 RX ADMIN — Medication 100 MILLIGRAM(S): at 04:35

## 2025-04-14 RX ADMIN — Medication 20 MILLIGRAM(S): at 03:36

## 2025-04-14 RX ADMIN — Medication 4 MILLIGRAM(S): at 16:08

## 2025-04-14 RX ADMIN — Medication 1000 MILLILITER(S): at 04:36

## 2025-04-14 RX ADMIN — LISINOPRIL 10 MILLIGRAM(S): 5 TABLET ORAL at 13:22

## 2025-04-14 RX ADMIN — Medication 1000 MILLILITER(S): at 03:37

## 2025-04-14 RX ADMIN — Medication 4 MILLIGRAM(S): at 16:23

## 2025-04-14 RX ADMIN — Medication 400 MILLIGRAM(S): at 04:35

## 2025-04-14 RX ADMIN — Medication 4 MILLIGRAM(S): at 04:35

## 2025-04-14 NOTE — ED PROVIDER NOTE - OBJECTIVE STATEMENT
55 y/o f hx HTN presents transferred from [facility] for admission for abd pain, choledocholithiasis on imaging.  Pt was seen at [facility] 4/13 for abd pain, n/v, had CT a/p showing dilated cbd and had elevated liver enzymes.  Pt was discharged and advised to f/u with surgery but returned today c/o persistent abd pain, n/v.  Pt had w/u at [facility] showing uptrending liver enzymes and given persistent sx, decision was made to admit pt to medicine with plan for GI eval, likely MRCP.  Pt was accepted for admission and brought to [facility] where was found to be in afib on arrival and reportedly from bed board admission was canceled and pt brought to ED for further evaluation.  Pt was incidentally covid +, reports mild sore throat over the past 3 days.  Denies fever, diarrhea, CP, SOB, all other ROS negative.

## 2025-04-14 NOTE — ED PROVIDER NOTE - PROGRESS NOTE DETAILS
discussed with medicine regarding admission and being told by MALACHI they will not accept pt until surgery has evaluated, surgery consulted, will f/u recs pt seen by surgery, no acute surgical intervention, recommending GI, MRCP, admit to medicine

## 2025-04-14 NOTE — H&P ADULT - PROBLEM SELECTOR PLAN 5
CTAP: nodular soft tissue area in the proximal transverse colon may represent intestinal debris however a colonoscopy not recently performed, recommend colonoscopy to exclude polypoid lesion.  Patient denies any previous colonoscopies, although had FIT test 2 years ago which was normal  - Outpatient follow up

## 2025-04-14 NOTE — H&P ADULT - PROBLEM SELECTOR PLAN 3
Patient with incidental finding of COVID with mild URI symptoms  No oxygen requirements  - Isolation precautions  - No remdesivir or dexamethasone indicated at this time

## 2025-04-14 NOTE — ED PROVIDER NOTE - CPE EDP RESP NORM
normal... Has The Growth Been Previously Biopsied?: has been previously biopsied Body Location Override (Optional): right lateral forehead

## 2025-04-14 NOTE — CONSULT NOTE ADULT - ASSESSMENT
54F with PMH of HTN and PSH of LUCAS (~10years ago) presenting for nausea, vomiting, severe epigastric pain and diarrhea which started 3 days ago. Patient was transferred from  to [facility] ED yesterday, after work up which revealed choledocholiathiasis. CT abdomen showed mild thickening vs. underdistention of descending colon and rectosigmoid colon, may represent colitis infecitous vs inflammatory, along with mildly prominent intrahepatic and extrahepatic biliary ducts without definitive radiopaque mass/calculus. RUQ US showed biliary ductal dilation with the CBD measuring to 9mm. Rads recommended further eval with MRI/MRCP. No cholelithiasis or evidence of acute cholecystitis on US. Labs with AST/ALT mildly elevated, normal t.bili and normal lipase. Patient was discharged with naproxen and zofran. Returned to  ED tonight with severe epigastric pain and persistent bilious vomiting, unable to tolerate PO. Plan was to admit to [facility] under medicine with plan for GI eval, likely MRCP. Pt was accepted for admission and brought to [facility], found to be in afib on arrival, reportedly bedboard cancelled admission and patient was brought to ED for further evaluation. Patient incidentally tested positive for COVID, reported mild sore throat, cough and runny nose over past 3 days. Reports mild chills since Saturday but denied any fever, CP or SOB. In the ED, patient was normotensive, nontachycardic, afebrile, satting well in RA. On exam, abd soft, mildly TTP in epigastric region, ND, neg [place]. Labs with , Hg 14.2, AST//373 (63/63), alk phos 126 (60), t bili 1.8 (0.3), lipase 73. No new imaging was completed today. General surgery was consulted for choledocholithiasis.    Recs:  - admit to medicine under Dr. Kaur for possible scope  - MRCP for further eval  - GI consult  - no surgical intervention at this time    Case discussed with Dr. Nuno and chief resident

## 2025-04-14 NOTE — ED ADULT NURSE NOTE - HIV OFFER
Will you ask if you risk factor things:  As she flown or had surgery recently?  Any history of blood clots?  Any extended periods of sitting down such as a long car ride?  As far as I know she does not smoke, can you confirm that.  Is she on hormones?    Thank you!   Previously Declined (within the last year) English

## 2025-04-14 NOTE — H&P ADULT - ASSESSMENT
Patient is a 55 y/o female with PMHx of HTN presents transferred from [facility] for admission for abd pain and choledocholithiasis found on imaging.

## 2025-04-14 NOTE — ED ADULT NURSE NOTE - OBJECTIVE STATEMENT
Patient is a 54y/F transfer from [facility] with c/o abdominal pain with nausea,vomiting,diarrhea for 3 days. pt was also diagnosed with new onset afib. pt is awake,alert,ox4. denies any chest pain, no SOB. pt is awake, alert, ox4. pt changed to hospital gown,

## 2025-04-14 NOTE — ED PROVIDER NOTE - CLINICAL SUMMARY MEDICAL DECISION MAKING FREE TEXT BOX
Colitis versus gastroenteritis versus choledocholithiasis.  This is patient's second ED visit over the past 2 days, patient returning for severe upper abdominal pain with vomiting and inability to tolerate p.o.  Most likely will need admission to rule out choledocholithiasis given recent imaging showed dilated CBD, patient would probably benefit from MRCP versus ERCP.  Will order labs, analgesia ordered, IV fluids, antiemetics, IV antibiotics for possible colitis ordered. Colitis versus gastroenteritis versus choledocholithiasis.  This is patient's second ED visit over the past 2 days, patient returning for severe upper abdominal pain with vomiting and inability to tolerate p.o.  Most likely will need admission to rule out choledocholithiasis given recent imaging showed dilated CBD, patient would probably benefit from MRCP versus ERCP.  Will order labs, analgesia ordered, IV fluids, antiemetics, IV antibiotics for possible colitis ordered.    4am Labs reviewed. Worsening LFTs compared to yesterday including AST/ALT. t bili today elevated (yesterday normal), Alk phos elevated today (normal yesterday), high suspicion for choledocholithiasis. Patient with no diarrhea while in ED for 4 hours, no RF for c diff, no recent travel. non isolation bed, discussed with Dr. Meza medicine hospitalist who accepts patient for admission Colitis versus gastroenteritis versus choledocholithiasis.  This is patient's second ED visit over the past 2 days, patient returning for severe upper abdominal pain with vomiting and inability to tolerate p.o.  Most likely will need admission to rule out choledocholithiasis given recent imaging showed dilated CBD, patient would probably benefit from MRCP versus ERCP.  Will order labs, analgesia ordered, IV fluids, antiemetics, IV antibiotics for possible colitis ordered.    4am Labs reviewed. Worsening LFTs compared to yesterday including AST/ALT. t bili today elevated (yesterday normal), Alk phos elevated today (normal yesterday), high suspicion for choledocholithiasis. Patient with no diarrhea while in ED for 4 hours, no RF for c diff, no recent travel. non isolation bed, discussed with Dr. Meza medicine hospitalist who accepts patient for admission    tested positive for covid-19, isolation order placed.

## 2025-04-14 NOTE — H&P ADULT - NSHPPHYSICALEXAM_GEN_ALL_CORE
T(C): 36.4 (04-14-25 @ 10:05), Max: 37 (04-14-25 @ 00:12)  HR: 70 (04-14-25 @ 10:05) (67 - 94)  BP: 157/95 (04-14-25 @ 10:05) (142/78 - 157/95)  RR: 18 (04-14-25 @ 10:05) (16 - 18)  SpO2: 96% (04-14-25 @ 10:05) (94% - 97%)    CONSTITUTIONAL: Well groomed, no apparent distress  EYES: PERRLA and symmetric, EOMI, No conjunctival or scleral injection, non-icteric  ENMT: Oral mucosa with moist membranes. Normal dentition; no pharyngeal injection or exudates             NECK: Supple, symmetric and without tracheal deviation   RESP: No respiratory distress, no use of accessory muscles; CTA b/l, no WRR  CV: RRR, +S1S2, no MRG; no JVD; no peripheral edema  GI: Soft, NT, ND, no rebound, no guarding; no palpable masses; no hepatosplenomegaly; no hernia palpated  LYMPH: No cervical LAD or tenderness; no axillary LAD or tenderness; no inguinal LAD or tenderness  MSK: Normal gait; No digital clubbing or cyanosis; examination of the (head/neck/spine/ribs/pelvis, RUE, LUE, RLE, LLE) without misalignment,            Normal ROM without pain, no spinal tenderness, normal muscle strength/tone  SKIN: No rashes or ulcers noted; no subcutaneous nodules or induration palpable  NEURO: CN II-XII intact; normal reflexes in upper and lower extremities, sensation intact in upper and lower extremities b/l to light touch   PSYCH: Appropriate insight/judgment; A+O x 3, mood and affect appropriate, recent/remote memory intact T(C): 36.4 (04-14-25 @ 10:05), Max: 37 (04-14-25 @ 00:12)  HR: 70 (04-14-25 @ 10:05) (67 - 94)  BP: 157/95 (04-14-25 @ 10:05) (142/78 - 157/95)  RR: 18 (04-14-25 @ 10:05) (16 - 18)  SpO2: 96% (04-14-25 @ 10:05) (94% - 97%)    CONSTITUTIONAL: Well groomed, no apparent distress  EYES: PERRLA and symmetric, EOMI, No conjunctival or scleral injection, non-icteric  ENMT: Oral mucosa with moist membranes. Normal dentition; no pharyngeal injection or exudates  RESP: No respiratory distress, no use of accessory muscles; CTA b/l, no WRR  CV: RRR, +S1S2, no MRG; no JVD; no peripheral edema  GI: Soft, NT, ttp over the epigastric region  SKIN: No rashes or ulcers noted; no subcutaneous nodules or induration palpable  NEURO: CN II-XII intact; normal reflexes in upper and lower extremities, sensation intact in upper and lower extremities b/l to light touch   PSYCH: Appropriate insight/judgment; A+O x 3, mood and affect appropriate, recent/remote memory intact

## 2025-04-14 NOTE — ED PROVIDER NOTE - CLINICAL SUMMARY MEDICAL DECISION MAKING FREE TEXT BOX
55 y/o f hx HTN presents transferred from [facility] for admission to [facility] for choledocholithiasis with persistent abd pain, n/v.  Vitals in ED unremarkable, pt in afib which is new although rate controlled in 70s and asymptomatic.  Will discuss with medicine for admission.

## 2025-04-14 NOTE — ED ADULT NURSE NOTE - COVID-19 RESULT DATE/TIME

## 2025-04-14 NOTE — H&P ADULT - ATTENDING COMMENTS
Ms. Rojas is a 55yo woman with PMHx HTN who presented to [facility] 4/13 for abdominal pain who then re-presented 4/14 for worsening sx, found to have direct hyperbilirubinemia and transaminitis c/f choledocolithiasis, noted to be in rate-controlled afib on transfer, now admitted to [facility].    Patient reports severe abdominal pain, nausea, vomiting and subjective fevers at home. Reports no hx of afib. On exam, well-appearing, cardiac RRR, lungs clear, abdomen soft mild tender (after morphine), no SHASTA. Covid+    #Direct hyperbilirubinemia  #Transaminitis  - MRCP done -- CBD dilated up to 8mm with mild intrahepatic biliary dilatation, no visible stones. DDx includes sphincter of Oddi dysfunction  - Trend hepatic panel  - GI, surgery consulted    #New onset afib, rate-controlled -- noted on EKG 6am 4/14. Suspect iso underlying process (likely choledocolithiasis). [id number] 2 (HTN, female).  - Monitor on tele -- has been in sinus, anticipate can transfer to [facility] tomorrow if remains in NSR or rate-controlled afib  - Will need discussion of AC prior to discharge, may benefit from Holter/ILR    #Covid-19 - no O2 requirement, denies SOB  - C/w supportive care

## 2025-04-14 NOTE — ED PROVIDER NOTE - OBJECTIVE STATEMENT
54-year-old female with past medical history of hypertension presents complaining of nausea, vomiting, severe epigastric pain, diarrhea that started 3 days ago.  Patient was seen and evaluated in the emergency department yesterday, had extensive workup including labs, CT abdomen and right upper quadrant ultrasound.  CT abdomen showed mild thickening versus underdistention of the descending colon and rectosigmoid colon which may represent colitis differential including infectious versus inflammatory etiology.  Also mildly prominent intrahepatic and extrahepatic biliary ducts without definitive radiopaque mass or calculus.  Right upper quadrant ultrasound showed biliary ductal dilation with the common bile duct measuring up to 9 mm.  Radiologist recommended further evaluation with MRI/ MRCP.  No cholelithiasis or evidence of acute cholecystitis on ultrasound.  Labs from yesterday reviewed, normal white count AST and ALT mildly elevated, normal T. bili, normal lipase.  Patient was discharged home with naproxen and Zofran.  Patient return to the emergency room tonight with severe epigastric pain and persistent vomiting, unable to tolerate p.o.

## 2025-04-14 NOTE — ED PROVIDER NOTE - ATTENDING APP SHARED VISIT CONTRIBUTION OF CARE
55 yo F h/o HTN presents transferred from [facility] for admission for abd pain, choledocholithiasis on imaging.  Pt was seen at [facility] 4/13 for abd pain, n/v, had CT a/p showing dilated cbd and had elevated liver enzymes.  Pt was discharged and advised to f/u with surgery but returned today c/o persistent abd pain, n/v.  Pt had w/u at [facility] showing uptrending liver enzymes and given persistent sx, decision was made to admit pt to medicine with plan for GI eval, likely MRCP.  Pt was accepted for admission and brought to [facility] where was found to be in afib on arrival and reportedly from bed board admission was canceled and pt brought to ED for further evaluation.  Pt was incidentally covid +, reports mild sore throat over the past 3 days.  Denies fever, diarrhea, CP, SOB, all other ROS negative.  Pt w rate controlled afib.  Labs, imaging from [facility] reviewed.  Plan  for admit - will discuss w MALACHI about dispo.

## 2025-04-14 NOTE — H&P ADULT - PROBLEM SELECTOR PLAN 6
F: mIVF as patient with reduced PO intake  E: replete as necessary  N: regular, as tolerated  GI: none  DVT: improve score 0  Dispo: Hospitalist Tele musculoskeletal

## 2025-04-14 NOTE — H&P ADULT - NSHPLABSRESULTS_GEN_ALL_CORE
.  LABS:                         14.2   5.14  )-----------( 171      ( 14 Apr 2025 02:31 )             43.0     04-14    141  |  103  |  11  ----------------------------<  113[H]  3.6   |  32[H]  |  1.01    Ca    9.0      14 Apr 2025 02:31  Mg     2.2     04-14    TPro  x   /  Alb  x   /  TBili  x   /  DBili  1.2[H]  /  AST  x   /  ALT  x   /  AlkPhos  x   04-14      Urinalysis Basic - ( 14 Apr 2025 02:31 )    Color: x / Appearance: x / SG: x / pH: x  Gluc: 113 mg/dL / Ketone: x  / Bili: x / Urobili: x   Blood: x / Protein: x / Nitrite: x   Leuk Esterase: x / RBC: x / WBC x   Sq Epi: x / Non Sq Epi: x / Bacteria: x                RADIOLOGY, EKG & ADDITIONAL TESTS: Reviewed.

## 2025-04-14 NOTE — PATIENT PROFILE ADULT - FALL HARM RISK - UNIVERSAL INTERVENTIONS
Bed in lowest position, wheels locked, appropriate side rails in place/Call bell, personal items and telephone in reach/Instruct patient to call for assistance before getting out of bed or chair/Non-slip footwear when patient is out of bed/[place] to call system/Physically safe environment - no spills, clutter or unnecessary equipment/Purposeful Proactive Rounding/Room/bathroom lighting operational, light cord in reach

## 2025-04-14 NOTE — ED PROVIDER NOTE - PHYSICAL EXAMINATION
CONSTITUTIONAL: Well-appearing; well-nourished; in no apparent distress.   	HEAD: Normocephalic; atraumatic.   	EYES:  conjunctiva and sclera clear  	ENT: normal nose; no rhinorrhea; normal pharynx with no erythema or lesions.   	NECK: Supple; non-tender;   	CARDIOVASCULAR: Normal S1, S2; no murmurs, rubs, or gallops. Regular rate and rhythm.   	RESPIRATORY: Breathing easily; breath sounds clear and equal bilaterally; no wheezes, rhonchi, or rales.  	GI: Soft; non-distended; epigastric tenderness. no guarding or rebound.   	EXT: DOLL x 4. normal gait.   	SKIN: Normal for age and race; warm; dry; good turgor; no apparent lesions or rash.   	NEURO: A & O x 3; face symmetric; grossly unremarkable.   PSYCHOLOGICAL: The patient’s mood and manner are appropriate.

## 2025-04-14 NOTE — H&P ADULT - PROBLEM SELECTOR PLAN 1
CT A/P showing mildly prominent intrahepatic and extra hepatic biliary ducts without definite radiopaque mass or calculus, correlate with bilirubin levels and liver function tests  RUQ ultrasound showing ductal dilatation with the common bile duct measuring up to 9 mm. Recommend further evaluation with MRI/MRCP. No cholelithiasis or evidence of acute cholecystitis.  - surgery consulted, appreciate recs: MRCP for further eval, no surgical intervention at this time

## 2025-04-14 NOTE — ED ADULT TRIAGE NOTE - CHIEF COMPLAINT QUOTE
Pt walked in c/o worsening abdominal pain. Recently evaluated in this ED tonight. Notes having nausea, unable to tolerate PO.

## 2025-04-14 NOTE — H&P ADULT - NSHPSOCIALHISTORY_GEN_ALL_CORE
Occupation: on disability (from work related injuries), former [employer]   Tobacco use: denies  EtOH use: occasional, socially  Drug use: denies  Living situation: independent in ADLs

## 2025-04-14 NOTE — H&P ADULT - NS ATTEST RISK PROBLEM GEN_ALL_CORE FT
1. Acute or chronic illness or injury which poses a threat to life or bodily function  2. Independent review of test (EKG), review prior records, review of test results, consideration of ordering test, independent assessment

## 2025-04-14 NOTE — ED PROVIDER NOTE - ATTENDING APP SHARED VISIT CONTRIBUTION OF CARE
Patient seen face to face.  No acute distress.  Worsening abdominal pain with rising LFTs.  Agree with management as provided by the [facility].  Patient to be admitted to medicine service for further care.

## 2025-04-14 NOTE — ED PROVIDER NOTE - ATTENDING SHARED VISIT SELECTORS
Most likely secondary to depression however will check lab work to rule out any other organic etiology EKG

## 2025-04-14 NOTE — H&P ADULT - PROBLEM SELECTOR PLAN 2
Admission EKG showing atrial fibrillation, HR < 100  At time of interview, patient in sinus rhythm on monitor  Patient denies any history of afib, has never had a Holter monitor or been diagnosed with irregular heart rhythms  CHADSVASC 2 for gender and HTN  - monitor telemetry for events Admission EKG showing atrial fibrillation, HR < 100  At time of interview, patient in sinus rhythm on monitor  Patient denies any history of afib, has never had a Holter monitor or been diagnosed with irregular heart rhythms  CHADSVASC 2 for gender and HTN  - defer AC for now as patient CHADSVASC bryce and pending possible procedures  - monitor telemetry for events, can step down to RMF tomorrow if not in RVR

## 2025-04-14 NOTE — H&P ADULT - HISTORY OF PRESENT ILLNESS
HPI: 53 y/o f hx HTN presents transferred from [facility] for admission for abd pain, choledocholithiasis on imaging.  Pt was seen at [facility] 4/13 for abd pain, n/v, had CT a/p showing dilated cbd and had elevated liver enzymes.  Pt was discharged and advised to f/u with surgery but returned today c/o persistent abd pain, n/v.  Pt had w/u at [facility] showing uptrending liver enzymes and given persistent sx, decision was made to admit pt to medicine with plan for GI eval, likely MRCP.  Pt was accepted for admission and brought to [facility] where was found to be in afib on arrival and reportedly from bed board admission was canceled and pt brought to ED for further evaluation.  Pt was incidentally covid +, reports mild sore throat over the past 3 days.  Denies fever, diarrhea, CP, SOB, all other ROS negative.    In the ED:  Initial vital signs: T: 98 F, HR: 94, BP: 146/101, R: 18, SpO2: 94% on RA  ED course:   Labs: significant for CO2 32, glucose 113, Total bili 1.8, direct bili 1.2, alk phosph 126, , , lipase 73, RVP positive for COVID  Imaging:   CTAP: Mild thickening versus underdistention of the descending colon and rectosigmoid colon may represent colitis, differential including infectious versus inflammatory etiology. No bowel obstruction. Normal appendix.  Nodular soft tissue area in the proximal transverse colon may represent intestinal debris however a colonoscopy not recently performed, recommend colonoscopy to exclude polypoid lesion.  Mildly prominent intrahepatic and extra hepatic biliary ducts without definite radiopaque mass or calculus, correlate with bilirubin levels and liver function tests  RUQ ultrasound: Biliary ductal dilatation with the common bile duct measuring up to 9 mm. Recommend further evaluation with MRI/MRCP. No cholelithiasis or evidence of acute cholecystitis. Right renal calculus, 0.9 cm. No hydronephrosis.  CXR: Heart and lungs normal  EKG:   Medications: none  Consults: surgery   HPI: 55 y/o f hx HTN presents transferred from [facility] for admission for abd pain and choledocholithiasis found on imaging. She states that 3 days ago, she started having epigastric pain as well as reduced appetite and intolerance to PO. She has had a gall stone in the past approximately 10 years ago, with similar symptoms to this current episode. At that time, the stone passed on its own. She has had associated vomiting (which has become bilious in appearance), and non bloody diarrhea. She was seen and discharged from [facility] one day prior to her current admission, at which time she was advised to follow up with surgery, but re-presented giving persistent symptoms.     In addition, she was found to be COVID positive on admission, reporting some mild symptoms including runny nose and occasional fever. Notes that she recently started going to the gym and believes she may have caught it there.     In the ED:  Initial vital signs: T: 98 F, HR: 94, BP: 146/101, R: 18, SpO2: 94% on RA  ED course:   Labs: significant for CO2 32, glucose 113, Total bili 1.8, direct bili 1.2, alk phosph 126, , , lipase 73, RVP positive for COVID  Imaging:   CTAP: Mild thickening versus underdistention of the descending colon and rectosigmoid colon may represent colitis, differential including infectious versus inflammatory etiology. No bowel obstruction. Normal appendix.  Nodular soft tissue area in the proximal transverse colon may represent intestinal debris however a colonoscopy not recently performed, recommend colonoscopy to exclude polypoid lesion.  Mildly prominent intrahepatic and extra hepatic biliary ducts without definite radiopaque mass or calculus, correlate with bilirubin levels and liver function tests  RUQ ultrasound: Biliary ductal dilatation with the common bile duct measuring up to 9 mm. Recommend further evaluation with MRI/MRCP. No cholelithiasis or evidence of acute cholecystitis. Right renal calculus, 0.9 cm. No hydronephrosis.  CXR: Heart and lungs normal  EKG:   Medications: none  Consults: surgery   Pharmacy: [address]    HPI: 55 y/o f hx HTN presents transferred from [facility] for admission for abd pain and choledocholithiasis found on imaging. She states that 3 days ago, she started having epigastric pain as well as reduced appetite and intolerance to PO. She has had a gall stone in the past approximately 10 years ago, with similar symptoms to this current episode. At that time, the stone passed on its own. She has had associated vomiting (which has become bilious in appearance), and non bloody diarrhea. She was seen and discharged from [facility] one day prior to her current admission, at which time she was advised to follow up with surgery, but re-presented giving persistent symptoms.     In addition, she was found to be COVID positive on admission, reporting some mild symptoms including runny nose and occasional fever. Notes that she recently started going to the gym and believes she may have caught it there.     In the ED:  Initial vital signs: T: 98 F, HR: 94, BP: 146/101, R: 18, SpO2: 94% on RA  ED course:   Labs: significant for CO2 32, glucose 113, Total bili 1.8, direct bili 1.2, alk phosph 126, , , lipase 73, RVP positive for COVID  Imaging:   CTAP: Mild thickening versus underdistention of the descending colon and rectosigmoid colon may represent colitis, differential including infectious versus inflammatory etiology. No bowel obstruction. Normal appendix.  Nodular soft tissue area in the proximal transverse colon may represent intestinal debris however a colonoscopy not recently performed, recommend colonoscopy to exclude polypoid lesion.  Mildly prominent intrahepatic and extra hepatic biliary ducts without definite radiopaque mass or calculus, correlate with bilirubin levels and liver function tests  RUQ ultrasound: Biliary ductal dilatation with the common bile duct measuring up to 9 mm. Recommend further evaluation with MRI/MRCP. No cholelithiasis or evidence of acute cholecystitis. Right renal calculus, 0.9 cm. No hydronephrosis.  CXR: Heart and lungs normal  EKG: Atrial fibrillation, nonspecific T wave abnormality, prolonged QT (493)   Medications: none  Consults: surgery

## 2025-04-14 NOTE — ED ADULT NURSE NOTE - OBJECTIVE STATEMENT
Pt presents to ED A&Ox4 with c/o epigastric pain for several hours with associated nausea. Pt reports being seen earlier today for N/V/D with abdominal pain and being diagnosed with kidney stone; pt discharged with prescriptions for pain medications. Pt notes being unable to  medications due to pharmacy being closed and is requesting pain medications. Denies fever/chills, dizziness, LOC, blood in stool. Denies dysuria or discharge.

## 2025-04-15 LAB
ADD ON TEST-SPECIMEN IN LAB: SIGNIFICANT CHANGE UP
ALBUMIN SERPL ELPH-MCNC: 4 G/DL — SIGNIFICANT CHANGE UP (ref 3.3–5)
ALP SERPL-CCNC: 104 U/L — SIGNIFICANT CHANGE UP (ref 40–120)
ALT FLD-CCNC: 222 U/L — HIGH (ref 10–45)
ANION GAP SERPL CALC-SCNC: 9 MMOL/L — SIGNIFICANT CHANGE UP (ref 5–17)
AST SERPL-CCNC: 110 U/L — HIGH (ref 10–40)
BASOPHILS # BLD AUTO: 0.02 K/UL — SIGNIFICANT CHANGE UP (ref 0–0.2)
BASOPHILS NFR BLD AUTO: 0.4 % — SIGNIFICANT CHANGE UP (ref 0–2)
BILIRUB SERPL-MCNC: 0.5 MG/DL — SIGNIFICANT CHANGE UP (ref 0.2–1.2)
BLD GP AB SCN SERPL QL: NEGATIVE — SIGNIFICANT CHANGE UP
BUN SERPL-MCNC: 12 MG/DL — SIGNIFICANT CHANGE UP (ref 7–23)
CALCIUM SERPL-MCNC: 9 MG/DL — SIGNIFICANT CHANGE UP (ref 8.4–10.5)
CHLORIDE SERPL-SCNC: 100 MMOL/L — SIGNIFICANT CHANGE UP (ref 96–108)
CO2 SERPL-SCNC: 30 MMOL/L — SIGNIFICANT CHANGE UP (ref 22–31)
CREAT SERPL-MCNC: 0.88 MG/DL — SIGNIFICANT CHANGE UP (ref 0.5–1.3)
EGFR: 78 ML/MIN/1.73M2 — SIGNIFICANT CHANGE UP
EGFR: 78 ML/MIN/1.73M2 — SIGNIFICANT CHANGE UP
EOSINOPHIL # BLD AUTO: 0.04 K/UL — SIGNIFICANT CHANGE UP (ref 0–0.5)
EOSINOPHIL NFR BLD AUTO: 0.8 % — SIGNIFICANT CHANGE UP (ref 0–6)
GLUCOSE SERPL-MCNC: 112 MG/DL — HIGH (ref 70–99)
HCT VFR BLD CALC: 41.3 % — SIGNIFICANT CHANGE UP (ref 34.5–45)
HGB BLD-MCNC: 13.6 G/DL — SIGNIFICANT CHANGE UP (ref 11.5–15.5)
IMM GRANULOCYTES NFR BLD AUTO: 0.2 % — SIGNIFICANT CHANGE UP (ref 0–0.9)
LYMPHOCYTES # BLD AUTO: 1.64 K/UL — SIGNIFICANT CHANGE UP (ref 1–3.3)
LYMPHOCYTES # BLD AUTO: 31.8 % — SIGNIFICANT CHANGE UP (ref 13–44)
MAGNESIUM SERPL-MCNC: 2.2 MG/DL — SIGNIFICANT CHANGE UP (ref 1.6–2.6)
MCHC RBC-ENTMCNC: 29.4 PG — SIGNIFICANT CHANGE UP (ref 27–34)
MCHC RBC-ENTMCNC: 32.9 G/DL — SIGNIFICANT CHANGE UP (ref 32–36)
MCV RBC AUTO: 89.2 FL — SIGNIFICANT CHANGE UP (ref 80–100)
MONOCYTES # BLD AUTO: 0.45 K/UL — SIGNIFICANT CHANGE UP (ref 0–0.9)
MONOCYTES NFR BLD AUTO: 8.7 % — SIGNIFICANT CHANGE UP (ref 2–14)
NEUTROPHILS # BLD AUTO: 2.99 K/UL — SIGNIFICANT CHANGE UP (ref 1.8–7.4)
NEUTROPHILS NFR BLD AUTO: 58.1 % — SIGNIFICANT CHANGE UP (ref 43–77)
NRBC BLD AUTO-RTO: 0 /100 WBCS — SIGNIFICANT CHANGE UP (ref 0–0)
PHOSPHATE SERPL-MCNC: 3.5 MG/DL — SIGNIFICANT CHANGE UP (ref 2.5–4.5)
PLATELET # BLD AUTO: 154 K/UL — SIGNIFICANT CHANGE UP (ref 150–400)
POTASSIUM SERPL-MCNC: 3.3 MMOL/L — LOW (ref 3.5–5.3)
POTASSIUM SERPL-SCNC: 3.3 MMOL/L — LOW (ref 3.5–5.3)
PROT SERPL-MCNC: 7 G/DL — SIGNIFICANT CHANGE UP (ref 6–8.3)
RBC # BLD: 4.63 M/UL — SIGNIFICANT CHANGE UP (ref 3.8–5.2)
RBC # FLD: 12.9 % — SIGNIFICANT CHANGE UP (ref 10.3–14.5)
RH IG SCN BLD-IMP: POSITIVE — SIGNIFICANT CHANGE UP
SODIUM SERPL-SCNC: 139 MMOL/L — SIGNIFICANT CHANGE UP (ref 135–145)
WBC # BLD: 5.15 K/UL — SIGNIFICANT CHANGE UP (ref 3.8–10.5)
WBC # FLD AUTO: 5.15 K/UL — SIGNIFICANT CHANGE UP (ref 3.8–10.5)

## 2025-04-15 PROCEDURE — 99222 1ST HOSP IP/OBS MODERATE 55: CPT

## 2025-04-15 PROCEDURE — 99497 ADVNCD CARE PLAN 30 MIN: CPT

## 2025-04-15 PROCEDURE — 99233 SBSQ HOSP IP/OBS HIGH 50: CPT | Mod: GC

## 2025-04-15 PROCEDURE — 93010 ELECTROCARDIOGRAM REPORT: CPT

## 2025-04-15 RX ORDER — ACETAMINOPHEN 500 MG/5ML
650 LIQUID (ML) ORAL EVERY 8 HOURS
Refills: 0 | Status: DISCONTINUED | OUTPATIENT
Start: 2025-04-15 | End: 2025-04-16

## 2025-04-15 RX ORDER — POLYETHYLENE GLYCOL 3350 17 G/17G
17 POWDER, FOR SOLUTION ORAL EVERY 24 HOURS
Refills: 0 | Status: DISCONTINUED | OUTPATIENT
Start: 2025-04-15 | End: 2025-04-16

## 2025-04-15 RX ORDER — ENOXAPARIN SODIUM 100 MG/ML
40 INJECTION SUBCUTANEOUS EVERY 24 HOURS
Refills: 0 | Status: DISCONTINUED | OUTPATIENT
Start: 2025-04-15 | End: 2025-04-16

## 2025-04-15 RX ORDER — RITONAVIR 100 MG
100 CAPSULE ORAL
Refills: 0 | Status: DISCONTINUED | OUTPATIENT
Start: 2025-04-15 | End: 2025-04-15

## 2025-04-15 RX ADMIN — Medication 4 MILLIGRAM(S): at 06:00

## 2025-04-15 RX ADMIN — Medication 650 MILLIGRAM(S): at 17:30

## 2025-04-15 RX ADMIN — LISINOPRIL 10 MILLIGRAM(S): 5 TABLET ORAL at 08:50

## 2025-04-15 RX ADMIN — Medication 650 MILLIGRAM(S): at 18:30

## 2025-04-15 RX ADMIN — Medication 40 MILLIEQUIVALENT(S): at 12:33

## 2025-04-15 RX ADMIN — Medication 650 MILLIGRAM(S): at 09:30

## 2025-04-15 RX ADMIN — Medication 4 MILLIGRAM(S): at 12:37

## 2025-04-15 RX ADMIN — Medication 650 MILLIGRAM(S): at 10:30

## 2025-04-15 RX ADMIN — Medication 4 MILLIGRAM(S): at 05:29

## 2025-04-15 RX ADMIN — ENOXAPARIN SODIUM 40 MILLIGRAM(S): 100 INJECTION SUBCUTANEOUS at 10:58

## 2025-04-15 RX ADMIN — Medication 20 MILLIEQUIVALENT(S): at 08:18

## 2025-04-15 NOTE — PROGRESS NOTE ADULT - ATTENDING COMMENTS
54 YOF with PMH Of HTN presenting with intractable epigastric pain with associated N/V found to have CBD dilatation, admitted for further management. Incidentally found to be in rate controlled AF on admission and COVID-19 positive (upon review of ROS, endorsed URI symptoms).     Vitals, labs, imaging reviewed. Reports pain tolerable with morphine. She tried to have breakfast today but had emesis afterwards, has poor appetite. Otherwise no acute complaints.     CBD dilatation - no evidence of stone or obstructing lesion on MRCP. AST/ALT improving. DDx includes sphincter of Oddi dysfunction vs ?passed cholelithiases (similar event several years ago). Evaluated by GI and surgery who advise no intervention. Cont anti-emetic PRN, monitor PO tolerance. If improved, close outpatient surgery eval for CCY.     Transient AFIB - ?provoked in setting of COVID-19, has remained in sinus on tele since initial event. [id number] 2, discussed r/b/a of AC vs ambulatory ECG monitoring to assess AF burden. Patient would like to defer AC at this time, understands risk of stroke, will consult EP on discharge for zio-patch vs MCOT. Obtain TTE, TSH, transfer to [facility].     COVID-19 positive - mild symptomatic disease, briefly on 2L NC but weaned to RA today with no exertional hypoxia. Per pharmacy, no indication for paxlovid.     Dispo - anticipate home pending PO tolerance

## 2025-04-15 NOTE — CONSULT NOTE ADULT - ASSESSMENT
54F with PMH of HTN, who was transferred from [facility] for admission for abd pain and choledocholithiasis found on imaging. GI consulted for elevated LFTs and bilirubin.     CT abd/pelvis 04/14/25  - Mild thickening versus underdistention of the descending colon and rectosigmoid colon may represent colitis, differential including infectious versus inflammatory etiology. No bowel obstruction. Normal appendix.  - Nodular soft tissue area in the proximal transverse colon may represent intestinal debris however a colonoscopy not recently performed, recommend colonoscopy to exclude polypoid lesion.  - Mildly prominent intrahepatic and extra hepatic biliary ducts without definite radiopaque mass or calculus, correlate with bilirubin levels and liver function tests    Underwent MRCP, which showed:   - The common bile duct is dilated up to 8 mm with also mild intrahepatic biliary dilatation; no obstructing mass or stone visible; etiology is indeterminate.  - The differential may include sphincter of Oddi dysfunction; a follow-up MRI/MRCP 3-6 months can be considered to ensure stability.  - No evidence for cholelithiasis or cholecystitis.    Recommendations   - consult surgery for possible cholecystectomy   - trend CBC and CMP daily   - can consider ursodiol upon discharge     Case discussed with Dr. Turner. GI Team will continue.     Dina Angulo D.O.   Gastroenterology Fellow  Weekday 7am-5pm Pager: [phone]  Weeknights/Weekend/Holiday Coverage: Please call the  for contact info.       54F with PMH of HTN, who was transferred from [facility] for admission for abd pain and choledocholithiasis found on imaging. GI consulted for elevated LFTs and bilirubin.     CT abd/pelvis 04/14/25  - Mild thickening versus underdistention of the descending colon and rectosigmoid colon may represent colitis, differential including infectious versus inflammatory etiology. No bowel obstruction. Normal appendix.  - Nodular soft tissue area in the proximal transverse colon may represent intestinal debris however a colonoscopy not recently performed, recommend colonoscopy to exclude polypoid lesion.  - Mildly prominent intrahepatic and extra hepatic biliary ducts without definite radiopaque mass or calculus, correlate with bilirubin levels and liver function tests    Underwent MRCP, which showed:   - The common bile duct is dilated up to 8 mm with also mild intrahepatic biliary dilatation; no obstructing mass or stone visible; etiology is indeterminate.  - The differential may include sphincter of Oddi dysfunction; a follow-up MRI/MRCP 3-6 months can be considered to ensure stability.  - No evidence for cholelithiasis or cholecystitis.    Recommendations   - consult surgery for possible cholecystectomy   - trend CBC and CMP daily   - can consider ursodiol upon discharge   - would recommend out-patient colonoscopy given CT findings of nodular soft tissue area in the proximal transverse colon   - patient can follow-up out-patient  at [address] [place] [address], [address], [place] [address] (phone: [phone]) with the GI fellows clinic     Case discussed with Dr. Turner. GI Team will continue.     Dina Angulo D.O.   Gastroenterology Fellow  Weekday 7am-5pm Pager: [phone]  Weeknights/Weekend/Holiday Coverage: Please call the  for contact info.

## 2025-04-15 NOTE — PROGRESS NOTE ADULT - SUBJECTIVE AND OBJECTIVE BOX
**INCOMPLETE NOTE    INTERVAL/OVERNIGHT EVENTS: None    SUBJECTIVE:  Patient seen and examined at bedside, comfortable, NAD. Denied fever, chest pain, dyspnea, abdominal pain.     Vital Signs Last 12 Hrs  T(F): 98.9 (04-15-25 @ 05:10), Max: 98.9 (04-15-25 @ 05:10)  HR: 69 (04-15-25 @ 09:57) (65 - 77)  BP: 130/84 (04-15-25 @ 09:57) (130/84 - 176/95)  BP(mean): 103 (04-15-25 @ 09:57) (97 - 128)  RR: 17 (04-15-25 @ 09:57) (17 - 20)  SpO2: 95% (04-15-25 @ 09:57) (92% - 98%)  I&O's Summary      PHYSICAL EXAM:  General: NAD  HEENT: PERRL, EOM intact, sclera anicteric, MMM  Cardiovascular: RRR; no MRG;   Respiratory: CTAB; no WRR  GI/: soft; NTND; BS x4  Extremities: WWP; 2+ peripheral pulses bilaterally; no LE edema  Skin: normal color & turgor; no rash  Neurologic: aox3; no focal deficits    LABS:                        13.6   5.15  )-----------( 154      ( 15 Apr 2025 05:30 )             41.3     04-15    139  |  100  |  12  ----------------------------<  112[H]  3.3[L]   |  30  |  0.88    Ca    9.0      15 Apr 2025 05:30  Phos  3.5     04-15  Mg     2.2     04-15    TPro  7.0  /  Alb  4.0  /  TBili  0.5  /  DBili  x   /  AST  110[H]  /  ALT  222[H]  /  AlkPhos  104  04-15      Urinalysis Basic - ( 15 Apr 2025 05:30 )    Color: x / Appearance: x / SG: x / pH: x  Gluc: 112 mg/dL / Ketone: x  / Bili: x / Urobili: x   Blood: x / Protein: x / Nitrite: x   Leuk Esterase: x / RBC: x / WBC x   Sq Epi: x / Non Sq Epi: x / Bacteria: x          RADIOLOGY & ADDITIONAL TESTS:    MEDICATIONS  (STANDING):  acetaminophen     Tablet .. 650 milliGRAM(s) Oral every 8 hours  enoxaparin Injectable 40 milliGRAM(s) SubCutaneous every 24 hours  hydrochlorothiazide 25 milliGRAM(s) Oral every 24 hours  lisinopril 10 milliGRAM(s) Oral every 24 hours  polyethylene glycol 3350 17 Gram(s) Oral every 24 hours    MEDICATIONS  (PRN):  morphine  - Injectable 4 milliGRAM(s) IV Push every 8 hours PRN Severe Pain (7 - 10)  ondansetron Injectable 4 milliGRAM(s) IV Push every 8 hours PRN Nausea and/or Vomiting   **INCOMPLETE NOTE    INTERVAL/OVERNIGHT EVENTS: 14 beats of ventricular tachycardia at 7:21am this morning, self-resolved     SUBJECTIVE:  Patient seen and examined at bedside, appeared uncomfortable, speaking slowly but A&Ox3. Denied fever, chest pain, dyspnea. Endorses sweating and epigastric and back pain that has not improved with anything other than morphine, which was administered 2 hours before the interview. She also endorses sore throat. She reported having had both bilious vomiting and diarrhea over the past several days, but vomiting resolved last night and diarrhea resolved earlier yesterday. She has tolerated PO intake since last night. She was hypertensive during the interview (170s) systolic. She usually takes her medications for hypertension in the morning. During the exam, she was breathing room air with O2 sats between 92 and 95. Patient replaced the nasal cannula and the O2 saturation improved.    Vital Signs Last 12 Hrs  T(F): 98.9 (04-15-25 @ 05:10), Max: 98.9 (04-15-25 @ 05:10)  HR: 69 (04-15-25 @ 09:57) (65 - 77)  BP: 130/84 (04-15-25 @ 09:57) (130/84 - 176/95)  BP(mean): 103 (04-15-25 @ 09:57) (97 - 128)  RR: 17 (04-15-25 @ 09:57) (17 - 20)  SpO2: 95% (04-15-25 @ 09:57) (92% - 98%)  I&O's Summary      PHYSICAL EXAM:  General: uncomfortable, diaphoretic  HEENT: PERRL, EOM intact, sclera anicteric, MMM  Cardiovascular: RRR; no MRG;   Respiratory: CTAB; no WRR  GI/: soft; NTND; BS x4; no tenderness to palpation at Mello's point or in the epigastrium  Extremities: WWP; 2+ peripheral pulses bilaterally; no LE edema  Skin: normal color & turgor; no rash  Neurologic: aox3; no focal deficits    LABS:                        13.6   5.15  )-----------( 154      ( 15 Apr 2025 05:30 )             41.3     04-15    139  |  100  |  12  ----------------------------<  112[H]  3.3[L]   |  30  |  0.88    Ca    9.0      15 Apr 2025 05:30  Phos  3.5     04-15  Mg     2.2     04-15    TPro  7.0  /  Alb  4.0  /  TBili  0.5  /  DBili  x   /  AST  110[H]  /  ALT  222[H]  /  AlkPhos  104  04-15      Urinalysis Basic - ( 15 Apr 2025 05:30 )    Color: x / Appearance: x / SG: x / pH: x  Gluc: 112 mg/dL / Ketone: x  / Bili: x / Urobili: x   Blood: x / Protein: x / Nitrite: x   Leuk Esterase: x / RBC: x / WBC x   Sq Epi: x / Non Sq Epi: x / Bacteria: x          RADIOLOGY & ADDITIONAL TESTS:    MEDICATIONS  (STANDING):  acetaminophen     Tablet .. 650 milliGRAM(s) Oral every 8 hours  enoxaparin Injectable 40 milliGRAM(s) SubCutaneous every 24 hours  hydrochlorothiazide 25 milliGRAM(s) Oral every 24 hours  lisinopril 10 milliGRAM(s) Oral every 24 hours  polyethylene glycol 3350 17 Gram(s) Oral every 24 hours    MEDICATIONS  (PRN):  morphine  - Injectable 4 milliGRAM(s) IV Push every 8 hours PRN Severe Pain (7 - 10)  ondansetron Injectable 4 milliGRAM(s) IV Push every 8 hours PRN Nausea and/or Vomiting   INTERVAL/OVERNIGHT EVENTS: 14 beats of ventricular tachycardia at 7:21am this morning, self-resolved     SUBJECTIVE:  Patient seen and examined at bedside, appeared uncomfortable, speaking slowly but A&Ox3. Denied fever, chest pain, dyspnea. Endorses sweating and epigastric and back pain that has not improved with anything other than morphine, which was administered 2 hours before the interview. She also endorses sore throat. She reported having had both bilious vomiting and diarrhea over the past several days, but vomiting resolved last night and diarrhea resolved earlier yesterday. She has tolerated PO intake since last night. She was hypertensive during the interview (170s) systolic. She usually takes her medications for hypertension in the morning. During the exam, she was breathing room air with O2 sats between 92 and 95. Patient replaced the nasal cannula and the O2 saturation improved.    Vital Signs Last 12 Hrs  T(F): 98.9 (04-15-25 @ 05:10), Max: 98.9 (04-15-25 @ 05:10)  HR: 69 (04-15-25 @ 09:57) (65 - 77)  BP: 130/84 (04-15-25 @ 09:57) (130/84 - 176/95)  BP(mean): 103 (04-15-25 @ 09:57) (97 - 128)  RR: 17 (04-15-25 @ 09:57) (17 - 20)  SpO2: 95% (04-15-25 @ 09:57) (92% - 98%)  I&O's Summary    PHYSICAL EXAM:  General: uncomfortable, diaphoretic  HEENT: PERRL, EOM intact, sclera anicteric, MMM  Cardiovascular: RRR; no MRG;   Respiratory: CTAB; no WRR  GI/: soft; NTND; BS x4; no tenderness to palpation at Mello's point or in the epigastrium  Extremities: WWP; 2+ peripheral pulses bilaterally; no LE edema  Skin: normal color & turgor; no rash  Neurologic: aox3; no focal deficits    LABS:                        13.6   5.15  )-----------( 154      ( 15 Apr 2025 05:30 )             41.3     04-15    139  |  100  |  12  ----------------------------<  112[H]  3.3[L]   |  30  |  0.88    Ca    9.0      15 Apr 2025 05:30  Phos  3.5     04-15  Mg     2.2     04-15    TPro  7.0  /  Alb  4.0  /  TBili  0.5  /  DBili  x   /  AST  110[H]  /  ALT  222[H]  /  AlkPhos  104  04-15      Urinalysis Basic - ( 15 Apr 2025 05:30 )    Color: x / Appearance: x / SG: x / pH: x  Gluc: 112 mg/dL / Ketone: x  / Bili: x / Urobili: x   Blood: x / Protein: x / Nitrite: x   Leuk Esterase: x / RBC: x / WBC x   Sq Epi: x / Non Sq Epi: x / Bacteria: x          RADIOLOGY & ADDITIONAL TESTS:    MEDICATIONS  (STANDING):  acetaminophen     Tablet .. 650 milliGRAM(s) Oral every 8 hours  enoxaparin Injectable 40 milliGRAM(s) SubCutaneous every 24 hours  hydrochlorothiazide 25 milliGRAM(s) Oral every 24 hours  lisinopril 10 milliGRAM(s) Oral every 24 hours  polyethylene glycol 3350 17 Gram(s) Oral every 24 hours    MEDICATIONS  (PRN):  morphine  - Injectable 4 milliGRAM(s) IV Push every 8 hours PRN Severe Pain (7 - 10)  ondansetron Injectable 4 milliGRAM(s) IV Push every 8 hours PRN Nausea and/or Vomiting

## 2025-04-15 NOTE — CONSULT NOTE ADULT - SUBJECTIVE AND OBJECTIVE BOX
54F with PMH of HTN and PSH of LUCAS (~10years ago) presenting for nausea, vomiting, severe epigastric pain and diarrhea which started 3 days ago. Patient was transferred from  to [facility] ED yesterday, after work up which revealed choledocholiathiasis. CT abdomen showed mild thickening vs. underdistention of descending colon and rectosigmoid colon, may represent colitis infecitous vs inflammatory, along with mildly prominent intrahepatic and extrahepatic biliary ducts without definitive radiopaque mass/calculus. RUQ US showed biliary ductal dilation with the CBD measuring to 9mm. Rads recommended further eval with MRI/MRCP. No cholelithiasis or evidence of acute cholecystitis on US. Labs with AST/ALT mildly elevated, normal t.bili and normal lipase. Patient was discharged with naproxen and zofran.     Returned to  ED tonight with severe epigastric pain and persistent bilious vomiting, unable to tolerate PO. Plan was to admit to [facility] under medicine with plan for GI eval, likely MRCP. Pt was accepted for admission and brought to [facility], found to be in afib on arrival, reportedly bedboard cancelled admission and patient was brought to ED for further evaluation. Patient incidentally tested positive for COVID, reported mild sore throat, cough and runny nose over past 3 days. Reports mild chills since Saturday but denied any fever, CP or SOB.      In the ED, patient was normotensive, nontachycardic, afebrile, satting well in RA. On exam, abd soft, mildly TTP in epigastric region, ND, neg [place]. Labs with , Hg 14.2, AST//373 (63/63), alk phos 126 (60), t bili 1.8 (0.3), lipase 73. No new imaging was completed today. General surgery was consulted for choledocholithiasis.    PMH: HTN  PSH: LUCAS (10 years ago), L shoulder surgery  Meds: none  Allergies: penicillin (anaphylaxis)  SH: social drinker, no smoking or recreational drug use  FH: no sig FHx  Colonoscopy: none  Endoscopy: none      Vital Signs Last 24 Hrs  T(C): 36.7 (14 Apr 2025 06:45), Max: 37 (14 Apr 2025 00:12)  T(F): 98 (14 Apr 2025 06:45), Max: 98.6 (14 Apr 2025 00:12)  HR: 94 (14 Apr 2025 06:45) (67 - 94)  BP: 146/101 (14 Apr 2025 06:45) (142/78 - 149/86)  BP(mean): --  RR: 18 (14 Apr 2025 06:45) (16 - 18)  SpO2: 94% (14 Apr 2025 06:45) (94% - 97%)  Parameters below as of 14 Apr 2025 06:45  Patient On (Oxygen Delivery Method): room air    PHYSICAL EXAM:  Gen: A&Ox3, nad  CVS: RRR  RESP: no acute respiratory distress, no use of accessory muscles  Abd: abd soft, mildly TTP in epigastric region, ND, neg Mello's sign  Ext: wwp, no edema    LABS:                        14.2   5.14  )-----------( 171      ( 14 Apr 2025 02:31 )             43.0   04-14  141  |  103  |  11  ----------------------------<  113[H]  3.6   |  32[H]  |  1.01  Ca    9.0      14 Apr 2025 02:31  Mg     2.2     04-14  TPro  x   /  Alb  x   /  TBili  x   /  DBili  1.2[H]  /  AST  x   /  ALT  x   /  AlkPhos  x   04-14  Urinalysis Basic - ( 14 Apr 2025 02:31 )  Color: x / Appearance: x / SG: x / pH: x  Gluc: 113 mg/dL / Ketone: x  / Bili: x / Urobili: x   Blood: x / Protein: x / Nitrite: x   Leuk Esterase: x / RBC: x / WBC x   Sq Epi: x / Non Sq Epi: x / Bacteria: x    RADIOLOGY & ADDITIONAL STUDIES:  4/13 CTAP:  IMPRESSION:  Mild thickening versus underdistention of the descending colon and   rectosigmoid colon may represent colitis, differential including   infectious versus inflammatory etiology. No bowel obstruction. Normal   appendix.  Nodular soft tissue area in the proximal transverse colon may represent   intestinal debris however a colonoscopy not recently performed, recommend   colonoscopy to exclude polypoid lesion.  Mildly prominent intrahepatic and extra hepatic biliary ducts without   definite radiopaque mass or calculus, correlate with bilirubin levels and   liver function tests    4/13 RUQ US:  IMPRESSION:  Biliary ductal dilatation with the common bile duct measuring up to 9 mm.   Recommend further evaluation with MRI/MRCP.  No cholelithiasis or evidence of acute cholecystitis.  Right renal calculus, 0.9 cm. No hydronephrosis.    
  Initial GI Consult Note:     HPI:  54F with PMH of HTN, who was transferred from [facility] for admission for abd pain and choledocholithiasis found on imaging. GI consulted for elevated LFTs and bilirubin.     Initial labs significant for: CO2 32, glucose 113, Total bili 1.8, direct bili 1.2, alk phos 126, , , lipase 73, RVP positive for COVID    CT abd/pelvis 04/14/25  - Mild thickening versus underdistention of the descending colon and rectosigmoid colon may represent colitis, differential including infectious versus inflammatory etiology. No bowel obstruction. Normal appendix.  - Nodular soft tissue area in the proximal transverse colon may represent intestinal debris however a colonoscopy not recently performed, recommend colonoscopy to exclude polypoid lesion.  - Mildly prominent intrahepatic and extra hepatic biliary ducts without definite radiopaque mass or calculus, correlate with bilirubin levels and liver function tests    Underwent MRCP, which showed:   - The common bile duct is dilated up to 8 mm with also mild intrahepatic biliary dilatation; no obstructing mass or stone visible; etiology is indeterminate.  - The differential may include sphincter of Oddi dysfunction; a follow-up MRI/MRCP 3-6 months can be considered to ensure stability.  - No evidence for cholelithiasis or cholecystitis.    Patient seen and examined at bedside. Describes that she came to the hospital for epigastric pain as well as reduced appetite and intolerance to PO.   She has had a gall stone in the past approximately 10 years ago, with similar symptoms to this current episode.   Has had prior admissions as well as ED visits for a similar presentation. Currently COVID positive.       Allergies  penicillin (Anaphylaxis)  Intolerances    Home Medications:  hydroCHLOROthiazide 25 mg oral tablet: 1 tab(s) orally once a day (14 Apr 2025 12:00)  lisinopril 10 mg oral tablet: 1 tab(s) orally once a day (14 Apr 2025 11:51)    MEDICATIONS:  MEDICATIONS  (STANDING):  acetaminophen     Tablet .. 650 milliGRAM(s) Oral every 8 hours  enoxaparin Injectable 40 milliGRAM(s) SubCutaneous every 24 hours  hydrochlorothiazide 25 milliGRAM(s) Oral every 24 hours  lisinopril 10 milliGRAM(s) Oral every 24 hours  polyethylene glycol 3350 17 Gram(s) Oral every 24 hours    MEDICATIONS  (PRN):  morphine ER Tablet 15 milliGRAM(s) Oral every 12 hours PRN Severe pain  ondansetron Injectable 4 milliGRAM(s) IV Push every 8 hours PRN Nausea and/or Vomiting    PAST MEDICAL & SURGICAL HISTORY:  Hypertension      Hyperthyroidism      Vertigo      S/P rotator cuff repair      H/O cervical spine surgery        FAMILY HISTORY:    SOCIAL HISTORY:  [place]: [ ] Current, [ ] Former, [ ] Never; Pack Years:  Alcohol:  Illicit Drugs:      Vital Signs Last 24 Hrs  T(C): 36.9 (15 Apr 2025 14:08), Max: 37.2 (15 Apr 2025 05:10)  T(F): 98.4 (15 Apr 2025 14:08), Max: 98.9 (15 Apr 2025 05:10)  HR: 62 (15 Apr 2025 15:14) (62 - 77)  BP: 157/91 (15 Apr 2025 15:14) (130/84 - 176/95)  BP(mean): 118 (15 Apr 2025 15:14) (97 - 128)  RR: 13 (15 Apr 2025 15:14) (13 - 20)  SpO2: 95% (15 Apr 2025 15:14) (92% - 98%)    Parameters below as of 15 Apr 2025 15:14  Patient On (Oxygen Delivery Method): room air      PHYSICAL EXAM:  General: No acute distress  Lungs: Normal respiratory effort and no intercostal retractions  Cardiovascular: RRR  Abdomen: Soft, non-tender, non-distended  Neurological: Alert and oriented x3  Skin: Warm and dry. No obvious rash      LABS:                        13.6   5.15  )-----------( 154      ( 15 Apr 2025 05:30 )             41.3     04-15    139  |  100  |  12  ----------------------------<  112[H]  3.3[L]   |  30  |  0.88    Ca    9.0      15 Apr 2025 05:30  Phos  3.5     04-15  Mg     2.2     04-15    TPro  7.0  /  Alb  4.0  /  TBili  0.5  /  DBili  x   /  AST  110[H]  /  ALT  222[H]  /  AlkPhos  104  04-15      Urinalysis with Rflx Culture (collected 13 Apr 2025 08:58)  RADIOLOGY & ADDITIONAL STUDIES:     Reviewed

## 2025-04-15 NOTE — CONSULT NOTE ADULT - ATTENDING COMMENTS
ACS Attending Note Attestation    Patient is examined and evaluated at the bedside with the residents/PAs. Treatment plan discussed with the team, nurses, and consulting physicians and consulting teams. Medications, radiological studies and all other relevant studies reviewed. I reviewed the resident/PA note and agreed with above assessment and plan with following additions and corrections.    TALIA BEARD Patient is a 54y old  Female who presents with a chief complaint of Choledocholithiasis (14 Apr 2025 10:57)    Physical Exam:  Radiological studies reviewed by me with following noted:  Allergies    penicillin (Anaphylaxis)    Intolerances      PAST MEDICAL & SURGICAL HISTORY:  Hypertension      Hyperthyroidism      Vertigo      S/P rotator cuff repair      H/O cervical spine surgery        Vital Signs Last 24 Hrs  T(C): 36.8 (14 Apr 2025 12:10), Max: 37 (14 Apr 2025 00:12)  T(F): 98.2 (14 Apr 2025 12:10), Max: 98.6 (14 Apr 2025 00:12)  HR: 63 (14 Apr 2025 16:08) (63 - 94)  BP: 178/83 (14 Apr 2025 16:08) (142/78 - 178/83)  BP(mean): 119 (14 Apr 2025 16:08) (113 - 126)  RR: 15 (14 Apr 2025 16:08) (13 - 19)  SpO2: 99% (14 Apr 2025 16:08) (94% - 99%)    Parameters below as of 14 Apr 2025 16:08  Patient On (Oxygen Delivery Method): room air                            14.2   5.14  )-----------( 171      ( 14 Apr 2025 02:31 )             43.0     04-14    141  |  103  |  11  ----------------------------<  113[H]  3.6   |  32[H]  |  1.01    Ca    9.0      14 Apr 2025 02:31  Mg     2.2     04-14    TPro  x   /  Alb  x   /  TBili  x   /  DBili  1.2[H]  /  AST  x   /  ALT  x   /  AlkPhos  x   04-14    54F with PMH of HTN and PSH of LUCAS (~10years ago) presenting for nausea, vomiting, severe epigastric pain and diarrhea which started 3 days ago. Patient was transferred from  to [facility] ED yesterday, after work up which revealed choledocholiathiasis. CT abdomen showed mild thickening vs. underdistention of descending colon and rectosigmoid colon, may represent colitis infecitous vs inflammatory, along with mildly prominent intrahepatic and extrahepatic biliary ducts without definitive radiopaque mass/calculus. RUQ US showed biliary ductal dilation with the CBD measuring to 9mm. Rads recommended further eval with MRI/MRCP. No cholelithiasis or evidence of acute cholecystitis on US. Labs with AST/ALT mildly elevated, normal t.bili and normal lipase. Patient was discharged with naproxen and zofran. Returned to  ED tonight with severe epigastric pain and persistent bilious vomiting, unable to tolerate PO. Plan was to admit to [facility] under medicine with plan for GI eval, likely MRCP. Pt was accepted for admission and brought to [facility], found to be in afib on arrival, reportedly bedboard cancelled admission and patient was brought to ED for further evaluation. Patient incidentally tested positive for COVID, reported mild sore throat, cough and runny nose over past 3 days. Reports mild chills since Saturday but denied any fever, CP or SOB. In the ED, patient was normotensive, nontachycardic, afebrile, satting well in RA. On exam, abd soft, mildly TTP in epigastric region, ND, neg [place]. Labs with , Hg 14.2, AST//373 (63/63), alk phos 126 (60), t bili 1.8 (0.3), lipase 73. No new imaging was completed today. General surgery was consulted for choledocholithiasis.    Recs:  - admit to medicine under Dr. Kaur for possible scope  - MRCP for further eval  - GI consult  - no surgical intervention at this time          Luz Nuno MD, FACS  Trauma/ACS/Surgical Critical Care Attending
Likely a passed cbd stone.  This may be the second time if so.  Consideration to CCX to prevent a third event.  Thanks

## 2025-04-15 NOTE — PROGRESS NOTE ADULT - PROBLEM SELECTOR PLAN 5
CTAP: nodular soft tissue area in the proximal transverse colon may represent intestinal debris however a colonoscopy not recently performed, recommend colonoscopy to exclude polypoid lesion.  Patient denies any previous colonoscopies, although had FIT test 2 years ago which was normal  - Outpatient follow up CTAP: nodular soft tissue area in the proximal transverse colon may represent intestinal debris however a colonoscopy not recently performed, recommend colonoscopy to exclude polypoid lesion.  Patient denies any previous colonoscopies, although had FIT test 2 years ago which was normal    - Outpatient follow up

## 2025-04-15 NOTE — PROGRESS NOTE ADULT - PROBLEM SELECTOR PLAN 2
Admission EKG showing atrial fibrillation, HR < 100  At time of interview, patient in sinus rhythm on monitor  Patient denies any history of afib, has never had a Holter monitor or been diagnosed with irregular heart rhythms  CHADSVASC 2 for gender and HTN  - defer AC for now as patient CHADSVASC bryce and pending possible procedures  - monitor telemetry for events, can step down to RMF tomorrow if not in RVR Admission EKG showing atrial fibrillation, HR < 100  At time of interview, patient in sinus rhythm on monitor  Patient denies any history of afib, has never had a Holter monitor or been diagnosed with irregular heart rhythms  CHADSVASC 2 for gender and HTN    - Discuss risks v. benefits of anticoagulation as patient CHADSVASC bordeline, no procedures likely  - monitor telemetry for events (4/15 14 beats of ventricular tachycardia), can step down to RMF tomorrow if not in RVR  - f/u repeat EKG  - f/u echo Admission EKG showing atrial fibrillation, HR < 100  At time of interview, patient in sinus rhythm on monitor  Patient denies any history of afib, has never had a Holter monitor or been diagnosed with irregular heart rhythms  CHADSVASC 2 for gender and HTN    - Discuss risks v. benefits of anticoagulation as patient CHADSVASC bordeline, no procedures likely  - monitor telemetry for events (4/15 14 beats of ventricular tachycardia), can step down to RMF tomorrow if not in RVR  - repeat EKG NSR   - f/u echo

## 2025-04-15 NOTE — CHART NOTE - NSCHARTNOTEFT_GEN_A_CORE
PDI	My Rx	Current Rx	Drug Type	Rx Written	Rx Dispensed	Drug	Quantity	Days Supply	Prescriber Name	Prescriber MARIEL #  A	N	N	B	01/24/2025	02/05/2025	alprazolam 0.25 mg tablet	30	30	Aba Thomas)	[id number]  Payment Method Medicare Dispenser [id number]  A	N	N	B	12/01/2024	12/13/2024	alprazolam 0.25 mg tablet	30	30	Aba Thomas)	[id number]  Payment Method Medicare Dispenser [id number]  A	N	N	B	09/05/2024	09/13/2024	alprazolam 0.25 mg tablet	30	30	Aba Thomas)	[id number]  Payment Method Medicare Dispenser [facility] #[id number]  A	N	N	B	07/12/2024	07/15/2024	alprazolam 0.25 mg tablet	30	30	Aba Thomas)	[id number]  Payment Method Medicare Dispenser [facility] #[id number]

## 2025-04-15 NOTE — PROGRESS NOTE ADULT - PROBLEM SELECTOR PLAN 3
Patient with incidental finding of COVID with mild URI symptoms  No oxygen requirements  - Isolation precautions  - No remdesivir or dexamethasone indicated at this time Patient with incidental finding of COVID with mild URI symptoms  On 2L nasal cannula    - Isolation precautions  - No remdesivir or dexamethasone indicated at this time  - provide incentive spirometer  - room air and ambulator O2 sat  - 4/15 start paxlovid Patient with incidental finding of COVID with mild URI symptoms  On 2L nasal cannula    - Isolation precautions  - No remdesivir or dexamethasone indicated at this time  - paxlovid not needed  - provide incentive spirometer  - room air and ambulatory O2 sat Patient with incidental finding of COVID with mild URI symptoms  On 2L nasal cannula    - Isolation precautions  - No remdesivir or dexamethasone indicated at this time  - paxlovid not needed  - provide incentive spirometer  - room air and ambulatory O2 sat - 95%

## 2025-04-15 NOTE — PROGRESS NOTE ADULT - PROBLEM SELECTOR PLAN 6
F: mIVF as patient with reduced PO intake  E: replete as necessary  N: regular, as tolerated  GI: none  DVT: improve score 0  Dispo: Hospitalist Tele F: mIVF as patient with reduced PO intake  E: replete as necessary  N: regular, as tolerated  GI: none  DVT: start prophylaxis 4/15 - lovenox 40mg daily - improve score 0  Dispo: Hospitalist Tele F: Regular  E: replete K>4 and Mg>2  N: regular, as tolerated  GI: none  DVT: Lovenox 40mg daily  Dispo: RMF

## 2025-04-15 NOTE — PROGRESS NOTE ADULT - ASSESSMENT
Patient is a 53 y/o female with PMHx of HTN presents transferred from [facility] for admission for abd pain and choledocholithiasis found on imaging.  Patient is a 53 y/o female with PMHx of HTN presents transferred from [facility] for admission for persistent epigastric pain and bile duct dilatation found on imaging.

## 2025-04-15 NOTE — PROGRESS NOTE ADULT - PROBLEM SELECTOR PLAN 1
CT A/P showing mildly prominent intrahepatic and extra hepatic biliary ducts without definite radiopaque mass or calculus, correlate with bilirubin levels and liver function tests  RUQ ultrasound showing ductal dilatation with the common bile duct measuring up to 9 mm. Recommend further evaluation with MRI/MRCP. No cholelithiasis or evidence of acute cholecystitis.  - surgery consulted, appreciate recs: MRCP for further eval, no surgical intervention at this time CT A/P showing mildly prominent intrahepatic and extra hepatic biliary ducts without definite radiopaque mass or calculus, correlate with bilirubin levels and liver function tests    RUQ ultrasound showing ductal dilatation with the common bile duct measuring up to 9 mm.. No cholelithiasis or evidence of acute cholecystitis.    MRCP showed common bile duct measuring 8mm and dilated intra- and extrahepatic ducts without evidence of cholelithiasis or cholecystitis.    - pain management: switch from IV morphine 4mg to oral morphine 15mg bid PRN for severe pain  - standing tylenol 650mg q8h  - surgery consulted, appreciate recs: MRCP for further eval, no surgical intervention at this time  - f/u GI consult CT A/P showing mildly prominent intrahepatic and extra hepatic biliary ducts without definite radiopaque mass or calculus, correlate with bilirubin levels and liver function tests    RUQ ultrasound showing ductal dilatation with the common bile duct measuring up to 9 mm.. No cholelithiasis or evidence of acute cholecystitis.    MRCP showed common bile duct measuring 8mm and dilated intra- and extrahepatic ducts without evidence of cholelithiasis or cholecystitis.    - pain management: switch from IV morphine 4mg to oral morphine 15mg bid PRN for severe pain  - standing Tylenol 650mg q8h  - surgery consulted, appreciate recs: MRCP with intrahepatic and extrahepatic dilation, no surgical intervention at this time  - f/u GI consult

## 2025-04-16 VITALS
DIASTOLIC BLOOD PRESSURE: 89 MMHG | OXYGEN SATURATION: 96 % | SYSTOLIC BLOOD PRESSURE: 150 MMHG | RESPIRATION RATE: 18 BRPM | HEART RATE: 60 BPM

## 2025-04-16 DIAGNOSIS — K83.8 OTHER SPECIFIED DISEASES OF BILIARY TRACT: ICD-10-CM

## 2025-04-16 DIAGNOSIS — R19.7 DIARRHEA, UNSPECIFIED: ICD-10-CM

## 2025-04-16 DIAGNOSIS — I10 ESSENTIAL (PRIMARY) HYPERTENSION: ICD-10-CM

## 2025-04-16 DIAGNOSIS — Z88.0 ALLERGY STATUS TO PENICILLIN: ICD-10-CM

## 2025-04-16 DIAGNOSIS — R11.2 NAUSEA WITH VOMITING, UNSPECIFIED: ICD-10-CM

## 2025-04-16 DIAGNOSIS — R93.5 ABNORMAL FINDINGS ON DIAGNOSTIC IMAGING OF OTHER ABDOMINAL REGIONS, INCLUDING RETROPERITONEUM: ICD-10-CM

## 2025-04-16 LAB
ALBUMIN SERPL ELPH-MCNC: 4 G/DL — SIGNIFICANT CHANGE UP (ref 3.3–5)
ALP SERPL-CCNC: 99 U/L — SIGNIFICANT CHANGE UP (ref 40–120)
ALT FLD-CCNC: 156 U/L — HIGH (ref 10–45)
ANION GAP SERPL CALC-SCNC: 13 MMOL/L — SIGNIFICANT CHANGE UP (ref 5–17)
AST SERPL-CCNC: 53 U/L — HIGH (ref 10–40)
BASOPHILS # BLD AUTO: 0.03 K/UL — SIGNIFICANT CHANGE UP (ref 0–0.2)
BASOPHILS NFR BLD AUTO: 0.9 % — SIGNIFICANT CHANGE UP (ref 0–2)
BILIRUB SERPL-MCNC: 0.5 MG/DL — SIGNIFICANT CHANGE UP (ref 0.2–1.2)
BUN SERPL-MCNC: 13 MG/DL — SIGNIFICANT CHANGE UP (ref 7–23)
CALCIUM SERPL-MCNC: 9.1 MG/DL — SIGNIFICANT CHANGE UP (ref 8.4–10.5)
CHLORIDE SERPL-SCNC: 99 MMOL/L — SIGNIFICANT CHANGE UP (ref 96–108)
CO2 SERPL-SCNC: 29 MMOL/L — SIGNIFICANT CHANGE UP (ref 22–31)
CREAT SERPL-MCNC: 0.82 MG/DL — SIGNIFICANT CHANGE UP (ref 0.5–1.3)
EGFR: 84 ML/MIN/1.73M2 — SIGNIFICANT CHANGE UP
EGFR: 84 ML/MIN/1.73M2 — SIGNIFICANT CHANGE UP
EOSINOPHIL # BLD AUTO: 0.03 K/UL — SIGNIFICANT CHANGE UP (ref 0–0.5)
EOSINOPHIL NFR BLD AUTO: 0.9 % — SIGNIFICANT CHANGE UP (ref 0–6)
GLUCOSE SERPL-MCNC: 99 MG/DL — SIGNIFICANT CHANGE UP (ref 70–99)
HCT VFR BLD CALC: 43.1 % — SIGNIFICANT CHANGE UP (ref 34.5–45)
HGB BLD-MCNC: 14.4 G/DL — SIGNIFICANT CHANGE UP (ref 11.5–15.5)
LYMPHOCYTES # BLD AUTO: 2.32 K/UL — SIGNIFICANT CHANGE UP (ref 1–3.3)
LYMPHOCYTES # BLD AUTO: 61.6 % — HIGH (ref 13–44)
MAGNESIUM SERPL-MCNC: 2.2 MG/DL — SIGNIFICANT CHANGE UP (ref 1.6–2.6)
MCHC RBC-ENTMCNC: 29.1 PG — SIGNIFICANT CHANGE UP (ref 27–34)
MCHC RBC-ENTMCNC: 33.4 G/DL — SIGNIFICANT CHANGE UP (ref 32–36)
MCV RBC AUTO: 87.1 FL — SIGNIFICANT CHANGE UP (ref 80–100)
MONOCYTES # BLD AUTO: 0.27 K/UL — SIGNIFICANT CHANGE UP (ref 0–0.9)
MONOCYTES NFR BLD AUTO: 7.1 % — SIGNIFICANT CHANGE UP (ref 2–14)
NEUTROPHILS # BLD AUTO: 1.04 K/UL — LOW (ref 1.8–7.4)
NEUTROPHILS NFR BLD AUTO: 27.7 % — LOW (ref 43–77)
PHOSPHATE SERPL-MCNC: 3.4 MG/DL — SIGNIFICANT CHANGE UP (ref 2.5–4.5)
PLATELET # BLD AUTO: 162 K/UL — SIGNIFICANT CHANGE UP (ref 150–400)
POTASSIUM SERPL-MCNC: 3.6 MMOL/L — SIGNIFICANT CHANGE UP (ref 3.5–5.3)
POTASSIUM SERPL-SCNC: 3.6 MMOL/L — SIGNIFICANT CHANGE UP (ref 3.5–5.3)
PROT SERPL-MCNC: 7.2 G/DL — SIGNIFICANT CHANGE UP (ref 6–8.3)
RBC # BLD: 4.95 M/UL — SIGNIFICANT CHANGE UP (ref 3.8–5.2)
RBC # FLD: 12.5 % — SIGNIFICANT CHANGE UP (ref 10.3–14.5)
SODIUM SERPL-SCNC: 141 MMOL/L — SIGNIFICANT CHANGE UP (ref 135–145)
WBC # BLD: 3.77 K/UL — LOW (ref 3.8–10.5)
WBC # FLD AUTO: 3.77 K/UL — LOW (ref 3.8–10.5)

## 2025-04-16 PROCEDURE — 99285 EMERGENCY DEPT VISIT HI MDM: CPT | Mod: 25

## 2025-04-16 PROCEDURE — 86901 BLOOD TYPING SEROLOGIC RH(D): CPT

## 2025-04-16 PROCEDURE — 86900 BLOOD TYPING SEROLOGIC ABO: CPT

## 2025-04-16 PROCEDURE — 84443 ASSAY THYROID STIM HORMONE: CPT

## 2025-04-16 PROCEDURE — 84100 ASSAY OF PHOSPHORUS: CPT

## 2025-04-16 PROCEDURE — 86850 RBC ANTIBODY SCREEN: CPT

## 2025-04-16 PROCEDURE — 74183 MRI ABD W/O CNTR FLWD CNTR: CPT | Mod: MC

## 2025-04-16 PROCEDURE — 80053 COMPREHEN METABOLIC PANEL: CPT

## 2025-04-16 PROCEDURE — A9585: CPT

## 2025-04-16 PROCEDURE — 93005 ELECTROCARDIOGRAM TRACING: CPT

## 2025-04-16 PROCEDURE — 85025 COMPLETE CBC W/AUTO DIFF WBC: CPT

## 2025-04-16 PROCEDURE — 83735 ASSAY OF MAGNESIUM: CPT

## 2025-04-16 PROCEDURE — 36415 COLL VENOUS BLD VENIPUNCTURE: CPT

## 2025-04-16 PROCEDURE — 99239 HOSP IP/OBS DSCHRG MGMT >30: CPT | Mod: GC

## 2025-04-16 RX ORDER — POLYETHYLENE GLYCOL 3350 17 G/17G
17 POWDER, FOR SOLUTION ORAL
Qty: 0 | Refills: 0 | DISCHARGE
Start: 2025-04-16

## 2025-04-16 RX ORDER — METOPROLOL SUCCINATE 50 MG/1
1 TABLET, EXTENDED RELEASE ORAL
Qty: 90 | Refills: 1
Start: 2025-04-16 | End: 2025-06-14

## 2025-04-16 RX ADMIN — Medication 650 MILLIGRAM(S): at 10:03

## 2025-04-16 RX ADMIN — Medication 650 MILLIGRAM(S): at 00:10

## 2025-04-16 RX ADMIN — POLYETHYLENE GLYCOL 3350 17 GRAM(S): 17 POWDER, FOR SOLUTION ORAL at 00:10

## 2025-04-16 RX ADMIN — Medication 650 MILLIGRAM(S): at 01:10

## 2025-04-16 RX ADMIN — ENOXAPARIN SODIUM 40 MILLIGRAM(S): 100 INJECTION SUBCUTANEOUS at 10:03

## 2025-04-16 NOTE — PROGRESS NOTE ADULT - SUBJECTIVE AND OBJECTIVE BOX
Internal Medicine Progress Note  Galina Roche, PGY-1  [phone]    ******INCOMPLETE******    OVERNIGHT EVENTS/INTERVAL HPI:    SUBJECTIVE:      PHYSICAL EXAM:  GENERAL: NAD  HEAD:  Atraumatic, Normocephalic  EYES: EOMI, PERRLA, conjunctiva and sclera clear  ENMT: Moist mucous membranes, Good dentition, No lesions  NECK: Supple, No JVD  NERVOUS SYSTEM:  Alert & Oriented X3, Good concentration; Motor Strength 5/5 B/L upper and lower extremities  CHEST/LUNG: Clear to auscultation bilaterally; No rales, rhonchi, wheezing, or rubs  HEART: Regular rate and rhythm; No murmurs, rubs, or gallops  ABDOMEN: Soft, Nontender, Nondistended; Bowel sounds present  EXTREMITIES:  2+ Peripheral Pulses, No clubbing, cyanosis, or edema  LYMPH: No lymphadenopathy noted  SKIN: No rashes or lesions    Medications:  MEDICATIONS  (STANDING):  acetaminophen     Tablet .. 650 milliGRAM(s) Oral every 8 hours  enoxaparin Injectable 40 milliGRAM(s) SubCutaneous every 24 hours  hydrochlorothiazide 25 milliGRAM(s) Oral every 24 hours  lisinopril 10 milliGRAM(s) Oral every 24 hours  polyethylene glycol 3350 17 Gram(s) Oral every 24 hours    MEDICATIONS  (PRN):  morphine ER Tablet 15 milliGRAM(s) Oral every 12 hours PRN Severe pain  ondansetron Injectable 4 milliGRAM(s) IV Push every 8 hours PRN Nausea and/or Vomiting      Labs:                        13.6   5.15  )-----------( 154      ( 15 Apr 2025 05:30 )             41.3     04-15    139  |  100  |  12  ----------------------------<  112[H]  3.3[L]   |  30  |  0.88    Ca    9.0      15 Apr 2025 05:30  Phos  3.5     04-15  Mg     2.2     04-15    TPro  7.0  /  Alb  4.0  /  TBili  0.5  /  DBili  x   /  AST  110[H]  /  ALT  222[H]  /  AlkPhos  104  04-15        Urinalysis Basic - ( 15 Apr 2025 05:30 )    Color: x / Appearance: x / SG: x / pH: x  Gluc: 112 mg/dL / Ketone: x  / Bili: x / Urobili: x   Blood: x / Protein: x / Nitrite: x   Leuk Esterase: x / RBC: x / WBC x   Sq Epi: x / Non Sq Epi: x / Bacteria: x          Radiology: Reviewed

## 2025-04-16 NOTE — DISCHARGE NOTE PROVIDER - NSDCFUADDAPPT_GEN_ALL_CORE_FT
[facility], [place] [address]  Phone:  [phone]  For Appointments:  [phone]    [facility] at [facility],  [place]  [phone] [address] [address]

## 2025-04-16 NOTE — DISCHARGE NOTE PROVIDER - NSDCMRMEDTOKEN_GEN_ALL_CORE_FT
hydroCHLOROthiazide 25 mg oral tablet: 1 tab(s) orally once a day  lisinopril 10 mg oral tablet: 1 tab(s) orally once a day  polyethylene glycol 3350 oral powder for reconstitution: 17 gram(s) orally every 24 hours  Toprol-XL 25 mg oral tablet, extended release: 1 tab(s) orally once a day

## 2025-04-16 NOTE — DISCHARGE NOTE NURSING/CASE MANAGEMENT/SOCIAL WORK - NSDCFUADDAPPT_GEN_ALL_CORE_FT
[facility], [place] [address]  Phone:  [phone]  For Appointments:  [phone]    [facility] at [facility],  [place]  [phone] [address] [address]

## 2025-04-16 NOTE — PROGRESS NOTE ADULT - PROBLEM SELECTOR PLAN 3
Patient with incidental finding of COVID with mild URI symptoms  On 2L nasal cannula    - Isolation precautions  - No remdesivir or dexamethasone indicated at this time  - paxlovid not needed  - provide incentive spirometer  - room air and ambulatory O2 sat - 95%

## 2025-04-16 NOTE — PROGRESS NOTE ADULT - ASSESSMENT
54F with PMH of HTN and PSH of LUCAS (~10years ago) presenting for nausea, vomiting, severe epigastric pain and diarrhea which started 3 days ago. Patient was transferred from  to [facility] ED yesterday, after work up which revealed choledocholiathiasis. CT abdomen showed mild thickening vs. underdistention of descending colon and rectosigmoid colon, may represent colitis infecitous vs inflammatory, along with mildly prominent intrahepatic and extrahepatic biliary ducts without definitive radiopaque mass/calculus. RUQ US showed biliary ductal dilation with the CBD measuring to 9mm. Rads recommended further eval with MRI/MRCP. No cholelithiasis or evidence of acute cholecystitis on US. Labs with AST/ALT mildly elevated, normal t.bili and normal lipase. Patient was discharged with naproxen and zofran. Returned to  ED tonight with severe epigastric pain and persistent bilious vomiting, unable to tolerate PO. Plan was to admit to [facility] under medicine with plan for GI eval, likely MRCP. Pt was accepted for admission and brought to [facility], found to be in afib on arrival, reportedly bedboard cancelled admission and patient was brought to ED for further evaluation. Patient incidentally tested positive for COVID, reported mild sore throat, cough and runny nose over past 3 days. Reports mild chills since Saturday but denied any fever, CP or SOB. In the ED, patient was normotensive, nontachycardic, afebrile, satting well in RA. On exam, abd soft, mildly TTP in epigastric region, ND, neg [place]. Labs with , Hg 14.2, AST//373 (63/63), alk phos 126 (60), t bili 1.8 (0.3), lipase 73. No new imaging was completed today. General surgery was consulted for potential choledocholithiasis. MRCP since performed demonstrating CBD up to 8mm with mild intrahepatic jess dil, but without obstructing stones or masses. Pt asymptomatic at this time.       Recommendations:  - pending discussion with attending  54F with PMH of HTN and PSH of LUCAS (~10years ago) presenting for nausea, vomiting, severe epigastric pain and diarrhea which started 3 days ago. Patient was transferred from  to [facility] ED yesterday, after work up which revealed choledocholiathiasis. CT abdomen showed mild thickening vs. underdistention of descending colon and rectosigmoid colon, may represent colitis infecitous vs inflammatory, along with mildly prominent intrahepatic and extrahepatic biliary ducts without definitive radiopaque mass/calculus. RUQ US showed biliary ductal dilation with the CBD measuring to 9mm. Rads recommended further eval with MRI/MRCP. No cholelithiasis or evidence of acute cholecystitis on US. Labs with AST/ALT mildly elevated, normal t.bili and normal lipase. Patient was discharged with naproxen and zofran. Returned to  ED tonight with severe epigastric pain and persistent bilious vomiting, unable to tolerate PO. Plan was to admit to [facility] under medicine with plan for GI eval, likely MRCP. Pt was accepted for admission and brought to [facility], found to be in afib on arrival, reportedly bedboard cancelled admission and patient was brought to ED for further evaluation. Patient incidentally tested positive for COVID, reported mild sore throat, cough and runny nose over past 3 days. Reports mild chills since Saturday but denied any fever, CP or SOB. In the ED, patient was normotensive, nontachycardic, afebrile, satting well in RA. On exam, abd soft, mildly TTP in epigastric region, ND, neg [place]. Labs with , Hg 14.2, AST//373 (63/63), alk phos 126 (60), t bili 1.8 (0.3), lipase 73. No new imaging was completed today. General surgery was consulted for potential choledocholithiasis. MRCP since performed demonstrating CBD up to 8mm with mild intrahepatic jess dil, but without obstructing stones or masses. Pt asymptomatic at this time.       Recommendations:  - No indication for cholecystectomy (no stones, no gallbladder pathology)  - However, sphincter of oddi disfunction has not been definitively rulled out  - Consider HIDA scan with CCK to evaluate gallbladder ejection fraction  - Consider further workup with per GI. Workup may be done as outpatient, as appropriate per GI

## 2025-04-16 NOTE — PROGRESS NOTE ADULT - PROBLEM SELECTOR PLAN 6
F: Regular  E: replete K>4 and Mg>2  N: regular, as tolerated  GI: none  DVT: Lovenox 40mg daily  Dispo: RMF

## 2025-04-16 NOTE — PROGRESS NOTE ADULT - PROBLEM SELECTOR PLAN 1
CT A/P showing mildly prominent intrahepatic and extra hepatic biliary ducts without definite radiopaque mass or calculus, correlate with bilirubin levels and liver function tests    RUQ ultrasound showing ductal dilatation with the common bile duct measuring up to 9 mm.. No cholelithiasis or evidence of acute cholecystitis.    MRCP showed common bile duct measuring 8mm and dilated intra- and extrahepatic ducts without evidence of cholelithiasis or cholecystitis.    - pain management: switch from IV morphine 4mg to oral morphine 15mg bid PRN for severe pain, standing Tylenol 650mg q8h  - f/u GI consult

## 2025-04-16 NOTE — DISCHARGE NOTE PROVIDER - ATTENDING DISCHARGE PHYSICAL EXAMINATION:
Gen: sitting upright in bed at time of exam  HEENT: NCAT, MMM, clear OP  Neck: supple, trachea at midline  CV: RRR, +S1/S2  Pulm: adequate respiratory effort, no increased work of breathing  Abd: soft, NTND  Skin: warm and dry, no new rashes vs prior report  Ext: WWP  Neuro: AOx3, no gross focal neurological deficits  Psych: affect and behavior appropriate
4 = No assist / stand by assistance

## 2025-04-16 NOTE — DISCHARGE NOTE PROVIDER - CARE PROVIDERS DIRECT ADDRESSES
[email or web address]

## 2025-04-16 NOTE — PROGRESS NOTE ADULT - SUBJECTIVE AND OBJECTIVE BOX
Talking to self at times. Intrusive with peers, angry rambling at peers at times. Numerous derogatory comments per peers. Distracted. Went to court this am. Ate meals, well groomed.       05/25/17 1100   Behavioral Health   Hallucinations appears responding;auditory;other (see comment)  (Talking and laughing to self)   Thinking distractable;delusional;paranoid;poor concentration   Orientation person: oriented;place: oriented   Memory confabulation   Insight poor   Judgement impaired   Affect tense;irritable   Mood labile;irritable   Physical Appearance/Attire attire appropriate to age and situation   Hygiene well groomed   Speech pressured;rambling   Psychomotor / Gait balanced;steady   Psycho Education   Type of Intervention structured groups   Response refuses   Activities of Daily Living   Hygiene/Grooming independent   Oral Hygiene independent   Dress independent;street clothes   Laundry with supervision   Room Organization independent      SUBJECTIVE: Pt seen and examined at bedside this AM. She has no complaints at this time, denies nausea, vomiting, pain with PO intake.       MEDICATIONS  (STANDING):  acetaminophen     Tablet .. 650 milliGRAM(s) Oral every 8 hours  enoxaparin Injectable 40 milliGRAM(s) SubCutaneous every 24 hours  hydrochlorothiazide 25 milliGRAM(s) Oral every 24 hours  lisinopril 10 milliGRAM(s) Oral every 24 hours  polyethylene glycol 3350 17 Gram(s) Oral every 24 hours    MEDICATIONS  (PRN):  ondansetron Injectable 4 milliGRAM(s) IV Push every 8 hours PRN Nausea and/or Vomiting      Vital Signs Last 24 Hrs  T(C): 36.5 (16 Apr 2025 05:52), Max: 36.9 (15 Apr 2025 14:08)  T(F): 97.7 (16 Apr 2025 05:52), Max: 98.4 (15 Apr 2025 14:08)  HR: 60 (16 Apr 2025 07:44) (60 - 62)  BP: 150/89 (16 Apr 2025 07:44) (150/89 - 164/99)  BP(mean): 118 (15 Apr 2025 15:14) (118 - 118)  RR: 18 (16 Apr 2025 07:44) (13 - 18)  SpO2: 96% (16 Apr 2025 07:44) (95% - 96%)    Parameters below as of 16 Apr 2025 07:44  Patient On (Oxygen Delivery Method): room air      PHYSICAL EXAM:  General: NAD, pt resting comfortably in bed  Pulm: No respiratory distress, nonlabored breathing, on room air  CVS: NSR, HDS  Abd: Soft, NT, ND  Extremities: WWP, no edema                    I&O's Detail      LABS:                        14.4   3.77  )-----------( 162      ( 16 Apr 2025 05:30 )             43.1     04-16    141  |  99  |  13  ----------------------------<  99  3.6   |  29  |  0.82    Ca    9.1      16 Apr 2025 05:30  Phos  3.4     04-16  Mg     2.2     04-16    TPro  7.2  /  Alb  4.0  /  TBili  0.5  /  DBili  x   /  AST  53[H]  /  ALT  156[H]  /  AlkPhos  99  04-16      Urinalysis Basic - ( 16 Apr 2025 05:30 )    Color: x / Appearance: x / SG: x / pH: x  Gluc: 99 mg/dL / Ketone: x  / Bili: x / Urobili: x   Blood: x / Protein: x / Nitrite: x   Leuk Esterase: x / RBC: x / WBC x   Sq Epi: x / Non Sq Epi: x / Bacteria: x        RADIOLOGY & ADDITIONAL STUDIES:

## 2025-04-16 NOTE — PROGRESS NOTE ADULT - PROBLEM SELECTOR PLAN 2
Admission EKG showing atrial fibrillation, HR < 100  At time of interview, patient in sinus rhythm on monitor  Patient denies any history of afib, has never had a Holter monitor or been diagnosed with irregular heart rhythms  CHADSVASC 2 for gender and HTN    - Discuss risks v. benefits of anticoagulation as patient CHADSVASC bordeline, no procedures likely  - monitor telemetry for events (4/15 14 beats of ventricular tachycardia), can step down to RMF tomorrow if not in RVR  - repeat EKG NSR   - f/u echo

## 2025-04-16 NOTE — PROGRESS NOTE ADULT - ASSESSMENT
Patient is a 53 y/o female with PMHx of HTN presents transferred from [facility] for admission for persistent epigastric pain and bile duct dilatation found on imaging.

## 2025-04-16 NOTE — DISCHARGE NOTE NURSING/CASE MANAGEMENT/SOCIAL WORK - PATIENT PORTAL LINK FT
You can access the FollowMyHealth Patient Portal offered by [facility] by registering at the following website: [email or web address]. By joining [facility]’s FollowMyHealth portal, you will also be able to view your health information using other applications (apps) compatible with our system.

## 2025-04-16 NOTE — PROGRESS NOTE ADULT - NUTRITIONAL ASSESSMENT
The common bile duct is dilated up to 8 mm with also mild intrahepatic   biliary dilatation; no obstructing mass or stone visible; etiology is   indeterminate; the differential may include sphincter of Oddi   dysfunction; a follow-up MRI/MRCP 3-6 months can be considered to ensure   stability.    No evidence for cholelithiasis or cholecystitis.

## 2025-04-16 NOTE — DISCHARGE NOTE NURSING/CASE MANAGEMENT/SOCIAL WORK - FINANCIAL ASSISTANCE
[facility] provides services at a reduced cost to those who are determined to be eligible through [facility]’s financial assistance program. Information regarding [facility]’s financial assistance program can be found by going to [email or web address] or by calling [phone].

## 2025-04-16 NOTE — DISCHARGE NOTE PROVIDER - CARE PROVIDER_API CALL
Isaías Turner  Gastroenterology  [address] [place] [address], [address]  [place] [address]  Phone: [phone]  Fax: [phone]  Established Patient  Follow Up Time: Routine

## 2025-04-16 NOTE — DISCHARGE NOTE PROVIDER - HOSPITAL COURSE
53 y/o f hx HTN presents transferred from [facility] for admission for abd pain and choledocholithiasis found on imaging. She states that 3 days ago, she started having epigastric pain as well as reduced appetite and intolerance to PO. She has had a gall stone in the past approximately 10 years ago, with similar symptoms to this current episode. At that time, the stone passed on its own. She has had associated vomiting (which has become bilious in appearance), and non bloody diarrhea. She was seen and discharged from [facility] one day prior to her current admission, at which time she was advised to follow up with surgery, but re-presented giving persistent symptoms.   MRCP showed The common bile duct is dilated up to 8 mm with also mild intrahepatic biliary dilatation; no obstructing mass or stone visible; etiology is indeterminate. GI reported c/f sphincter of oddi dysfunction, repeat in 3-6 months.  General surgery said no need for intervention.    Choledocholithiasis.   CT A/P showing mildly prominent intrahepatic and extra hepatic biliary ducts without definite radiopaque mass or calculus, correlate with bilirubin levels and liver function tests  RUQ ultrasound showing ductal dilatation with the common bile duct measuring up to 9 mm.. No cholelithiasis or evidence of acute cholecystitis.  MRCP showed common bile duct measuring 8mm and dilated intra- and extrahepatic ducts without evidence of cholelithiasis or cholecystitis.  - Plan for likely repeat MRCP in 3-6 months      Atrial fibrillation.   Admission EKG showing atrial fibrillation, HR < 100  Patient's afib resolved,  sinus rhythm on monitor  Patient denies any history of afib, has never had a Holter monitor or been diagnosed with irregular heart rhythms  CHADSVASC 2 for gender and HTN  - No plan for AC as of now  - Recommend Toprol 25mg qd as per EP  - monitor telemetry for events (4/15 14 beats of ventricular tachycardia  - Cardiology patient for ILR, v Patch to assess afib burdem    COVID-19.   Patient with incidental finding of COVID with mild URI symptoms. Resolved upon discharge  On 2L nasal cannula  - Isolation precautions  - No remdesivir or dexamethasone indicated at this time  - paxlovid not needed  - room air and ambulatory O2 sat - 95%.    Hypertension.   ·  Plan: Home medications: lisinopril 10 mg daily, HCTZ 25 mg daily  - c/w home meds.       Colon polyp.   CTAP: nodular soft tissue area in the proximal transverse colon may represent intestinal debris however a colonoscopy not recently performed, recommend colonoscopy to exclude polypoid lesion.  Patient denies any previous colonoscopies, although had FIT test 2 years ago which was normal  - Outpatient follow up.      New medications: Toprol 25mg ER  Labs to be followed outpatient: N/A  Exam to be followed outpatient: MRCP in 6 months    PHYSICAL EXAM:  General: No acute distress  Lungs: Normal respiratory effort and no intercostal retractions  Cardiovascular: RRR  Abdomen: Soft, non-tender, non-distended  Neurological: Alert and oriented x3  Skin: Warm and dry. No obvious rash   55 y/o f hx HTN presents transferred from [facility] for admission for abd pain and choledocholithiasis found on imaging. She states that 3 days ago, she started having epigastric pain as well as reduced appetite and intolerance to PO. She has had a gall stone in the past approximately 10 years ago, with similar symptoms to this current episode. At that time, the stone passed on its own. She has had associated vomiting (which has become bilious in appearance), and non bloody diarrhea. She was seen and discharged from [facility] one day prior to her current admission, at which time she was advised to follow up with surgery, but re-presented giving persistent symptoms.   MRCP showed The common bile duct is dilated up to 8 mm with also mild intrahepatic biliary dilatation; no obstructing mass or stone visible; etiology is indeterminate. GI reported c/f sphincter of oddi dysfunction, repeat in 3-6 months.  General surgery said no need for intervention.    Choledocholithiasis.   CT A/P showing mildly prominent intrahepatic and extra hepatic biliary ducts without definite radiopaque mass or calculus, correlate with bilirubin levels and liver function tests  RUQ ultrasound showing ductal dilatation with the common bile duct measuring up to 9 mm.. No cholelithiasis or evidence of acute cholecystitis.  MRCP showed common bile duct measuring 8mm and dilated intra- and extrahepatic ducts without evidence of cholelithiasis or cholecystitis.  - Plan for likely repeat MRCP in 3-6 months      Atrial fibrillation.   Admission EKG showing atrial fibrillation, HR < 100  Patient's afib resolved,  sinus rhythm on monitor  Patient denies any history of afib, has never had a Holter monitor or been diagnosed with irregular heart rhythms  CHADSVASC 2 for gender and HTN  - No plan for AC as of now, ptn strongly would like to avoid AC  - Recommend Toprol 25mg qd as per electrophysiology inpatient  - sp monitor telemetry for events (4/15 w 14 beats of ventricular tachycardia)  - Cardiology outpatient follow up    COVID-19.   Patient with incidental finding of COVID with mild URI symptoms. Resolved upon discharge  On 2L nasal cannula  - Isolation precautions  - No remdesivir or dexamethasone indicated at this time  - paxlovid not needed  - room air and ambulatory O2 sat - 95%.    Hypertension.   ·  Plan: Home medications: lisinopril 10 mg daily, HCTZ 25 mg daily  - c/w home meds.       Colon polyp.   CTAP: nodular soft tissue area in the proximal transverse colon may represent intestinal debris however a colonoscopy not recently performed, recommend colonoscopy to exclude polypoid lesion.  Patient denies any previous colonoscopies, although had FIT test 2 years ago which was normal  - Outpatient follow up.      New medications: Toprol 25mg ER  Labs to be followed outpatient: N/A  Exam to be followed outpatient: MRCP in 6 months    PHYSICAL EXAM:  General: No acute distress  Lungs: Normal respiratory effort and no intercostal retractions  Cardiovascular: RRR  Abdomen: Soft, non-tender, non-distended  Neurological: Alert and oriented x3  Skin: Warm and dry. No obvious rash

## 2025-04-16 NOTE — DISCHARGE NOTE PROVIDER - NSDCCPTREATMENT_GEN_ALL_CORE_FT
PRINCIPAL PROCEDURE  Procedure: MR MRCP  Findings and Treatment: LOWER CHEST: Within normal limits.  LIVER: Within normal limits.  BILE DUCTS: The common bile duct is dilated up to 8 mm on image 8-6 with   also mildly dilated intrahepatic bile ducts. No obstructing stones or a   dominant stricture visible. Etiology indeterminate..  GALLBLADDER: Within normal limits.  SPLEEN: Within normal limits.  PANCREAS: The pancreas as well as the pancreatic duct are within normal   limits..  ADRENALS: Within normal limits.  KIDNEYS/URETERS: Within normal limits.  VISUALIZED PORTIONS:  BOWEL: Within normal limits.  PERITONEUM: No ascites.  VESSELS: Within normal limits.  RETROPERITONEUM/LYMPH NODES: No lymphadenopathy.  ABDOMINAL WALL: Within normal limits.  BONES: Within normal limits.  IMPRESSION:  The common bile duct is dilated up to 8 mm with also mild intrahepatic   biliary dilatation; no obstructing mass or stone visible; etiology is   indeterminate; the differential may include sphincter of Oddi   dysfunction; a follow-up MRI/MRCP 3-6 months can be considered to ensure   stability.  No evidence for cholelithiasis or cholecystitis.

## 2025-04-22 DIAGNOSIS — U07.1 COVID-19: ICD-10-CM

## 2025-04-22 DIAGNOSIS — Z88.0 ALLERGY STATUS TO PENICILLIN: ICD-10-CM

## 2025-04-22 DIAGNOSIS — I48.91 UNSPECIFIED ATRIAL FIBRILLATION: ICD-10-CM

## 2025-04-22 DIAGNOSIS — K80.50 CALCULUS OF BILE DUCT WITHOUT CHOLANGITIS OR CHOLECYSTITIS WITHOUT OBSTRUCTION: ICD-10-CM

## 2025-04-22 DIAGNOSIS — I10 ESSENTIAL (PRIMARY) HYPERTENSION: ICD-10-CM

## 2025-04-22 DIAGNOSIS — Z90.710 ACQUIRED ABSENCE OF BOTH CERVIX AND UTERUS: ICD-10-CM

## 2025-04-22 DIAGNOSIS — E80.7 DISORDER OF BILIRUBIN METABOLISM, UNSPECIFIED: ICD-10-CM

## 2025-04-22 DIAGNOSIS — R74.01 ELEVATION OF LEVELS OF LIVER TRANSAMINASE LEVELS: ICD-10-CM

## 2025-04-22 DIAGNOSIS — K63.5 POLYP OF COLON: ICD-10-CM

## 2025-04-22 DIAGNOSIS — E05.90 THYROTOXICOSIS, UNSPECIFIED WITHOUT THYROTOXIC CRISIS OR STORM: ICD-10-CM
